# Patient Record
Sex: FEMALE | Race: WHITE | NOT HISPANIC OR LATINO
[De-identification: names, ages, dates, MRNs, and addresses within clinical notes are randomized per-mention and may not be internally consistent; named-entity substitution may affect disease eponyms.]

---

## 2017-05-23 ENCOUNTER — APPOINTMENT (OUTPATIENT)
Dept: ENDOCRINOLOGY | Facility: CLINIC | Age: 62
End: 2017-05-23

## 2019-04-22 ENCOUNTER — APPOINTMENT (OUTPATIENT)
Dept: HEMATOLOGY ONCOLOGY | Facility: CLINIC | Age: 64
End: 2019-04-22
Payer: MEDICARE

## 2019-04-22 VITALS
HEIGHT: 69 IN | TEMPERATURE: 98.7 F | DIASTOLIC BLOOD PRESSURE: 60 MMHG | HEART RATE: 74 BPM | SYSTOLIC BLOOD PRESSURE: 110 MMHG | OXYGEN SATURATION: 98 % | WEIGHT: 146 LBS | BODY MASS INDEX: 21.62 KG/M2

## 2019-04-22 DIAGNOSIS — Z78.9 OTHER SPECIFIED HEALTH STATUS: ICD-10-CM

## 2019-04-22 DIAGNOSIS — Z83.49 FAMILY HISTORY OF OTHER ENDOCRINE, NUTRITIONAL AND METABOLIC DISEASES: ICD-10-CM

## 2019-04-22 DIAGNOSIS — Z86.19 PERSONAL HISTORY OF OTHER INFECTIOUS AND PARASITIC DISEASES: ICD-10-CM

## 2019-04-22 PROCEDURE — 99205 OFFICE O/P NEW HI 60 MIN: CPT | Mod: 25

## 2019-04-22 PROCEDURE — 36415 COLL VENOUS BLD VENIPUNCTURE: CPT

## 2019-04-22 RX ORDER — DENOSUMAB 60 MG/ML
60 INJECTION SUBCUTANEOUS
Refills: 0 | Status: ACTIVE | COMMUNITY

## 2019-04-22 RX ORDER — NYSTATIN 100000 [USP'U]/ML
SUSPENSION ORAL
Refills: 0 | Status: ACTIVE | COMMUNITY

## 2019-04-22 RX ORDER — TRIAMCINOLONE ACETONIDE 0.1 %
0.1 PASTE (GRAM) DENTAL
Refills: 0 | Status: ACTIVE | COMMUNITY

## 2019-04-22 RX ORDER — CHLORHEXIDINE GLUCONATE 4 %
400 LIQUID (ML) TOPICAL
Refills: 0 | Status: ACTIVE | COMMUNITY

## 2019-04-22 RX ORDER — CALCIUM CARBONATE/VITAMIN D3 600 MG-10
TABLET ORAL
Refills: 0 | Status: ACTIVE | COMMUNITY

## 2019-04-23 ENCOUNTER — LABORATORY RESULT (OUTPATIENT)
Age: 64
End: 2019-04-23

## 2019-04-23 LAB
ALBUMIN MFR SERPL ELPH: 63.8 %
ALBUMIN SERPL ELPH-MCNC: 4.8 G/DL
ALBUMIN SERPL-MCNC: 4.6 G/DL
ALBUMIN/GLOB SERPL: 1.8 RATIO
ALP BLD-CCNC: 43 U/L
ALPHA1 GLOB MFR SERPL ELPH: 3.1 %
ALPHA1 GLOB SERPL ELPH-MCNC: 0.2 G/DL
ALPHA2 GLOB MFR SERPL ELPH: 8.1 %
ALPHA2 GLOB SERPL ELPH-MCNC: 0.6 G/DL
ALT SERPL-CCNC: 15 U/L
ANION GAP SERPL CALC-SCNC: 14 MMOL/L
AST SERPL-CCNC: 27 U/L
B-GLOBULIN MFR SERPL ELPH: 9.4 %
B-GLOBULIN SERPL ELPH-MCNC: 0.7 G/DL
BASOPHILS # BLD AUTO: 0.05 K/UL
BASOPHILS NFR BLD AUTO: 1.1 %
BILIRUB SERPL-MCNC: 0.2 MG/DL
BUN SERPL-MCNC: 21 MG/DL
CALCIUM SERPL-MCNC: 9.9 MG/DL
CHLORIDE SERPL-SCNC: 104 MMOL/L
CO2 SERPL-SCNC: 22 MMOL/L
CREAT SERPL-MCNC: 0.74 MG/DL
DEPRECATED KAPPA LC FREE/LAMBDA SER: 0.91 RATIO
DEPRECATED KAPPA LC FREE/LAMBDA SER: 0.91 RATIO
EOSINOPHIL # BLD AUTO: 0.16 K/UL
EOSINOPHIL NFR BLD AUTO: 3.4 %
ERYTHROCYTE [SEDIMENTATION RATE] IN BLOOD BY WESTERGREN METHOD: 10 MM/HR
FERRITIN SERPL-MCNC: 68 NG/ML
GAMMA GLOB FLD ELPH-MCNC: 1.1 G/DL
GAMMA GLOB MFR SERPL ELPH: 15.6 %
GLUCOSE SERPL-MCNC: 93 MG/DL
HAPTOGLOB SERPL-MCNC: 67 MG/DL
HCT VFR BLD CALC: 33.6 %
HGB BLD-MCNC: 11 G/DL
IGA SER QL IEP: 178 MG/DL
IGG SER QL IEP: 1113 MG/DL
IGM SER QL IEP: 84 MG/DL
IMM GRANULOCYTES NFR BLD AUTO: 0.2 %
INTERPRETATION SERPL IEP-IMP: NORMAL
IRON SATN MFR SERPL: 24 %
IRON SERPL-MCNC: 68 UG/DL
KAPPA LC CSF-MCNC: 1.13 MG/DL
KAPPA LC CSF-MCNC: 1.13 MG/DL
KAPPA LC SERPL-MCNC: 1.03 MG/DL
KAPPA LC SERPL-MCNC: 1.03 MG/DL
LDH SERPL-CCNC: 204 U/L
LYMPHOCYTES # BLD AUTO: 1.62 K/UL
LYMPHOCYTES NFR BLD AUTO: 34.8 %
M PROTEIN SPEC IFE-MCNC: NORMAL
MAN DIFF?: NORMAL
MCHC RBC-ENTMCNC: 32 PG
MCHC RBC-ENTMCNC: 32.7 GM/DL
MCV RBC AUTO: 97.7 FL
MONOCYTES # BLD AUTO: 0.26 K/UL
MONOCYTES NFR BLD AUTO: 5.6 %
NEUTROPHILS # BLD AUTO: 2.55 K/UL
NEUTROPHILS NFR BLD AUTO: 54.9 %
PLATELET # BLD AUTO: 247 K/UL
POTASSIUM SERPL-SCNC: 4.3 MMOL/L
PROT SERPL-MCNC: 7.2 G/DL
RBC # BLD: 3.44 M/UL
RBC # FLD: 11.8 %
RHEUMATOID FACT SER QL: <10 IU/ML
SODIUM SERPL-SCNC: 140 MMOL/L
TIBC SERPL-MCNC: 287 UG/DL
UIBC SERPL-MCNC: 219 UG/DL
WBC # FLD AUTO: 4.65 K/UL

## 2019-04-24 LAB — ANA SER IF-ACNC: NEGATIVE

## 2019-04-25 LAB
COPPER SERPL-MCNC: 94 UG/DL
ZINC SERPL-MCNC: 92 UG/DL

## 2019-05-08 ENCOUNTER — APPOINTMENT (OUTPATIENT)
Dept: HEMATOLOGY ONCOLOGY | Facility: CLINIC | Age: 64
End: 2019-05-08
Payer: MEDICARE

## 2019-05-08 VITALS
HEIGHT: 69 IN | BODY MASS INDEX: 21.62 KG/M2 | TEMPERATURE: 98.6 F | DIASTOLIC BLOOD PRESSURE: 70 MMHG | HEART RATE: 72 BPM | OXYGEN SATURATION: 98 % | WEIGHT: 146 LBS | SYSTOLIC BLOOD PRESSURE: 128 MMHG

## 2019-05-08 PROCEDURE — 99212 OFFICE O/P EST SF 10 MIN: CPT

## 2019-05-08 NOTE — HISTORY OF PRESENT ILLNESS
[de-identified] : The patient is a 64 year old female with a history of osteoporosis,pericarditis, mitral valve prolapse, heart murmur, Hashimoto's thyroiditis who presents for evaluation of chronic leukopenia and recent anemia. The patient has had intermittent leukopenia for at least 14 years. She first noted an anemia in 2003 and underwent a gastrointestinal evaluation which was negative. Her last colonoscopy was in 2015 and was also negative. She became anemic again in 2016 and hemoglobin presently is 10.7 with a WBC of 2.7. The patient had a recent B 12 level of 1482 (with supplementation).She has eliminated red meat from her diet since 2003.  She denies fever, chills, sweats, frequent infections, blood in urine or stool or unintentional weight loss. She has had oral ulcers on occasion..

## 2019-05-08 NOTE — REVIEW OF SYSTEMS
[Patient Intake Form Reviewed] : Patient intake form was reviewed [Negative] : Heme/Lymph [Fatigue] : fatigue [Fever] : no fever [Chills] : no chills [Night Sweats] : no night sweats [Recent Change In Weight] : ~T no recent weight change [FreeTextEntry5] : anxiety related PVCs

## 2019-05-08 NOTE — ASSESSMENT
[FreeTextEntry1] : The patient is a 64 year old female with a history of osteoporosis,pericarditis, mitral valve prolapse, heart murmur, Hashimoto's thyroiditis who presents for evaluation of chronic leukopenia and recent anemia. Possible etiologies include iron deficiency anemia, immune disorder, hemolytic anemia, chronic intermittent idiopathic leukopenia, abnormal copper and/or zinc levels or bone marrow disorders such as myelodysplasia or other. Have ordered CBC, sedimentation rate, CMP, immunoglobulin quantitation with immunofixation, copper and zinc levels, FERNANDA, Rheumatoid Factor,LDH, haptoglobin and flow cytometry.  The patient will return in two weeks for discussion of results and further management.

## 2019-05-08 NOTE — CONSULT LETTER
[( Thank you for referring [unfilled] for consultation for _____ )] : Thank you for referring [unfilled] for consultation for [unfilled] [Dear  ___] : Dear  [unfilled], [Please see my note below.] : Please see my note below. [Consult Closing:] : Thank you very much for allowing me to participate in the care of this patient.  If you have any questions, please do not hesitate to contact me. [Sincerely,] : Sincerely, [Consult Letter:] : I had the pleasure of evaluating your patient, [unfilled]. [FreeTextEntry3] : Kathy Fox

## 2019-05-08 NOTE — PHYSICAL EXAM
[Normal] : affect appropriate [de-identified] : mild conjunctival pallor [de-identified] : RRR, S1,S2, murmur [de-identified] : warm, dry, pale, without ecchymoses or petechiae

## 2019-05-08 NOTE — END OF VISIT
[FreeTextEntry3] : All medical record entries made by the Scribe were at my, Dr. Kathy Fox, direction and personally dictated by me on 05/08/2019. I have reviewed the chart and agree that the record accurately reflects my personal performance of the history, physical exam, assessment and plan. I have also personally directed, reviewed, and agreed with the chart.

## 2019-05-08 NOTE — ASSESSMENT
[FreeTextEntry1] : Patient is a 64 year old female with a history of osteoporosis, pericarditis, mitral valve prolapse, heart murmur, Hashimoto's thyroiditis, anemia, and chronic leukopenia, who now presents for a discussion of results and further management.It is likely that the patient has chronic idiopathic intermittent leukopenia. Studies for the mild anemia were normal or nondiagnostic and perhaps is due to a chronic illness.. It is also possible the patient  has a low grade  myelodysplasia . Have discussed the possibility of bone marrow studies sometime in the future, however, will continue surveillance for the time being. Patient will return in three months.

## 2019-05-08 NOTE — HISTORY OF PRESENT ILLNESS
[de-identified] : Patient is a 64 year old female with a history of osteoporosis, pericarditis, mitral valve prolapse, heart murmur, Hashimoto's thyroiditis, anemia, and chronic leukopenia, who now presents for a discussion of results and further management. The patient has had intermittent leukopenia for at least 14 years. She first noted an anemia in 2003 and underwent a gastrointestinal evaluation which was negative. She became anemic again in 2016. Patient's  blood workup at the time of the consultation revealed a WBC of 4.65, hemoglobin 11.0, hematocrit 33.6, iron serum 68, ferritin 68. Protein and immunoelectrophoreses were normal as well as all other studies including peripheral blood flow cytometry. The patient denies fever, chills, sweats, frequent infections, blood in urine or stool or unintentional weight loss.

## 2019-06-24 ENCOUNTER — NON-APPOINTMENT (OUTPATIENT)
Age: 64
End: 2019-06-24

## 2019-06-24 ENCOUNTER — APPOINTMENT (OUTPATIENT)
Dept: OPHTHALMOLOGY | Facility: CLINIC | Age: 64
End: 2019-06-24
Payer: MEDICARE

## 2019-06-24 PROCEDURE — 92004 COMPRE OPH EXAM NEW PT 1/>: CPT

## 2019-07-23 ENCOUNTER — APPOINTMENT (OUTPATIENT)
Dept: HEMATOLOGY ONCOLOGY | Facility: CLINIC | Age: 64
End: 2019-07-23
Payer: MEDICARE

## 2019-07-23 VITALS
HEIGHT: 69 IN | WEIGHT: 150 LBS | DIASTOLIC BLOOD PRESSURE: 70 MMHG | BODY MASS INDEX: 22.22 KG/M2 | TEMPERATURE: 98.1 F | OXYGEN SATURATION: 99 % | HEART RATE: 72 BPM | SYSTOLIC BLOOD PRESSURE: 116 MMHG

## 2019-07-23 LAB
BASOPHILS # BLD AUTO: 0.04 K/UL
BASOPHILS NFR BLD AUTO: 0.8 %
EOSINOPHIL # BLD AUTO: 0.18 K/UL
EOSINOPHIL NFR BLD AUTO: 3.8 %
HCT VFR BLD CALC: 31.6 %
HGB BLD-MCNC: 10 G/DL
IMM GRANULOCYTES NFR BLD AUTO: 0.2 %
LYMPHOCYTES # BLD AUTO: 1.65 K/UL
LYMPHOCYTES NFR BLD AUTO: 35 %
MAN DIFF?: NORMAL
MCHC RBC-ENTMCNC: 31.3 PG
MCHC RBC-ENTMCNC: 31.6 GM/DL
MCV RBC AUTO: 99.1 FL
MONOCYTES # BLD AUTO: 0.3 K/UL
MONOCYTES NFR BLD AUTO: 6.4 %
NEUTROPHILS # BLD AUTO: 2.53 K/UL
NEUTROPHILS NFR BLD AUTO: 53.8 %
PLATELET # BLD AUTO: 213 K/UL
RBC # BLD: 3.19 M/UL
RBC # BLD: 3.19 M/UL
RBC # FLD: 11.6 %
RETICS # AUTO: 0.8 %
RETICS AGGREG/RBC NFR: 26.2 K/UL
WBC # FLD AUTO: 4.71 K/UL

## 2019-07-23 PROCEDURE — 36415 COLL VENOUS BLD VENIPUNCTURE: CPT

## 2019-07-23 PROCEDURE — 99214 OFFICE O/P EST MOD 30 MIN: CPT | Mod: 25

## 2019-07-23 RX ORDER — MULTIVIT-MIN/FOLIC/VIT K/LYCOP 400-300MCG
500 TABLET ORAL
Refills: 0 | Status: ACTIVE | COMMUNITY

## 2019-07-23 NOTE — END OF VISIT
[FreeTextEntry3] : All medical record entries made by the Scribe were at my, Dr. Kathy Fox, direction and personally dictated by me on 07/23/2019. I have reviewed the chart and agree that the record accurately reflects my personal performance of the history, physical exam, assessment and plan. I have also personally directed, reviewed, and agreed with the chart.

## 2019-07-23 NOTE — HISTORY OF PRESENT ILLNESS
[de-identified] : Patient is a 64 year old female with a history of osteoporosis, pericarditis, mitral valve prolapse, heart murmur, Hashimoto's thyroiditis, anemia, and chronic intermittent leukopenia, who now presents hematologic follow-up. Patient's previous blood results from April revealed a WBC of 4.65, hemoglobin of 11.0, and hematocrit of 33.6. The iron serum was 68 and ferritin 68. Protein and immunoelectrophoreses were normal as well as all other studies including peripheral blood flow cytometry. Patient admits to occasional fatigue but is feeling generally well at the time, with no acute complaints. Denies fever, chills, or sweats.

## 2019-07-23 NOTE — ASSESSMENT
[FreeTextEntry1] : Patient is a 64 year old female with a history of osteoporosis, pericarditis, mitral valve prolapse, heart murmur, Hashimoto's thyroiditis, anemia, and chronic intermittent leukopenia, who now presents for hematologic follow-up. It is likely that the patient has chronic idiopathic intermittent leukopenia. Studies for the mild anemia were normal or nondiagnostic and perhaps is due to a chronic illness. It is also possible the patient has a low grade myelodysplasia. Have ordered CBC, reticulocyte count, CMP, B12 and folate, iron panel, haptoglobin, ferritin, and LDH. Will continue surveillance for the time being. Patient was advised to call office to discuss results and next appointment date..

## 2019-07-23 NOTE — PHYSICAL EXAM
[Normal] : normal appearance, no rash, nodules, vesicles, ulcers, erythema [de-identified] : mild conjunctival pallor [de-identified] : RRR, S1,S2, murmur [de-identified] : warm, dry, pale, without ecchymoses or petechiae

## 2019-07-24 LAB
ALBUMIN SERPL ELPH-MCNC: 4.6 G/DL
ALP BLD-CCNC: 47 U/L
ALT SERPL-CCNC: 15 U/L
ANION GAP SERPL CALC-SCNC: 13 MMOL/L
AST SERPL-CCNC: 23 U/L
BILIRUB SERPL-MCNC: 0.2 MG/DL
BUN SERPL-MCNC: 18 MG/DL
CALCIUM SERPL-MCNC: 9.6 MG/DL
CHLORIDE SERPL-SCNC: 100 MMOL/L
CO2 SERPL-SCNC: 24 MMOL/L
CREAT SERPL-MCNC: 0.72 MG/DL
FERRITIN SERPL-MCNC: 62 NG/ML
FOLATE SERPL-MCNC: >20 NG/ML
GLUCOSE SERPL-MCNC: 92 MG/DL
HAPTOGLOB SERPL-MCNC: 62 MG/DL
IRON SATN MFR SERPL: 24 %
IRON SERPL-MCNC: 67 UG/DL
LDH SERPL-CCNC: 209 U/L
POTASSIUM SERPL-SCNC: 4.5 MMOL/L
PROT SERPL-MCNC: 7 G/DL
SODIUM SERPL-SCNC: 137 MMOL/L
TIBC SERPL-MCNC: 279 UG/DL
UIBC SERPL-MCNC: 212 UG/DL
VIT B12 SERPL-MCNC: 912 PG/ML

## 2019-10-22 ENCOUNTER — APPOINTMENT (OUTPATIENT)
Dept: HEMATOLOGY ONCOLOGY | Facility: CLINIC | Age: 64
End: 2019-10-22
Payer: MEDICARE

## 2019-10-22 VITALS
OXYGEN SATURATION: 98 % | SYSTOLIC BLOOD PRESSURE: 110 MMHG | BODY MASS INDEX: 21.92 KG/M2 | TEMPERATURE: 98.8 F | WEIGHT: 148 LBS | HEART RATE: 74 BPM | DIASTOLIC BLOOD PRESSURE: 68 MMHG | HEIGHT: 69 IN

## 2019-10-22 LAB
BASOPHILS # BLD AUTO: 0.04 K/UL
BASOPHILS NFR BLD AUTO: 1 %
EOSINOPHIL # BLD AUTO: 0.11 K/UL
EOSINOPHIL NFR BLD AUTO: 2.7 %
HCT VFR BLD CALC: 33.6 %
HGB BLD-MCNC: 10.4 G/DL
IMM GRANULOCYTES NFR BLD AUTO: 0.2 %
LYMPHOCYTES # BLD AUTO: 1.41 K/UL
LYMPHOCYTES NFR BLD AUTO: 35.1 %
MAN DIFF?: NORMAL
MCHC RBC-ENTMCNC: 31 GM/DL
MCHC RBC-ENTMCNC: 31 PG
MCV RBC AUTO: 100 FL
MONOCYTES # BLD AUTO: 0.25 K/UL
MONOCYTES NFR BLD AUTO: 6.2 %
NEUTROPHILS # BLD AUTO: 2.2 K/UL
NEUTROPHILS NFR BLD AUTO: 54.8 %
PLATELET # BLD AUTO: 245 K/UL
RBC # BLD: 3.36 M/UL
RBC # BLD: 3.36 M/UL
RBC # FLD: 11.9 %
RETICS # AUTO: 0.9 %
RETICS AGGREG/RBC NFR: 30.9 K/UL
WBC # FLD AUTO: 4.02 K/UL

## 2019-10-22 PROCEDURE — 99214 OFFICE O/P EST MOD 30 MIN: CPT | Mod: 25

## 2019-10-22 PROCEDURE — 36415 COLL VENOUS BLD VENIPUNCTURE: CPT

## 2019-10-22 NOTE — PHYSICAL EXAM
[Normal] : affect appropriate [de-identified] : mild conjunctival pallor [de-identified] : RRR, S1,S2, murmur [de-identified] : warm, dry, pale, without ecchymoses or petechiae

## 2019-10-22 NOTE — ASSESSMENT
[FreeTextEntry1] : Patient is a 64 year old female with a history of osteoporosis, pericarditis, mitral valve prolapse, heart murmur, Hashimoto's thyroiditis, anemia, and chronic intermittent leukopenia, who now presents for hematologic follow-up. It is likely that the patient has chronic idiopathic intermittent leukopenia. Studies for the mild anemia were normal or nondiagnostic and perhaps is due to a chronic illness. It is also possible the patient has a low grade myelodysplasia. Have ordered CBC, reticulocyte count, sedimentation rate, CMP, B12 and folate, iron panel, haptoglobin, ferritin, and LDH. Will continue surveillance for the time being. Patient was advised to call office to discuss results and next appointment date.

## 2019-10-22 NOTE — HISTORY OF PRESENT ILLNESS
[de-identified] : Patient is a 64 year old female with a history of osteoporosis, pericarditis, mitral valve prolapse, heart murmur, Hashimoto's thyroiditis, anemia, and chronic intermittent leukopenia, who now presents hematologic follow-up. Patient's previous blood results revealed the hemoglobin was 10.0 with a hematocrit of 31.6. The white blood cell and platelet counts were in normal range. The B12, folate, iron panel, and ferritin were all within normal limits. Since her last visit, the patient had two colonoscopies with Dr. Gavin Yanes (gastroenterologist). On the virtual colonoscopy, she was found to have mild to moderate diverticulosis with an element of spasm in the sigmoid colon. On the colonoscopy, patient was also found to have hemorrhoids. Patient also had an follow-up with Dr. Indiana Egan (endocrinologist), where all endocrinologic labs were normal, aside from a slightly elevated PTH. Patient admits to occasional fatigue and joint pain, but she is otherwise feeling generally well at this time, with no acute complaints. Denies hematuria, hematochezia, gingival bleeding, epistaxis, infection, fever, chills, or sweats. Of note, patient plans to get an influenza vaccination and shingles vaccination after her Prolia injection, which is scheduled for 10/30/19.

## 2019-10-22 NOTE — END OF VISIT
[FreeTextEntry3] : All medical record entries made by the Scribe were at my, Dr. Kathy Fox, direction and personally dictated by me on 10/22/2019. I have reviewed the chart and agree that the record accurately reflects my personal performance of the history, physical exam, assessment and plan. I have also personally directed, reviewed, and agreed with the chart.

## 2019-10-23 ENCOUNTER — LABORATORY RESULT (OUTPATIENT)
Age: 64
End: 2019-10-23

## 2019-10-23 LAB
ALBUMIN SERPL ELPH-MCNC: 4.7 G/DL
ALP BLD-CCNC: 39 U/L
ALT SERPL-CCNC: 14 U/L
ANION GAP SERPL CALC-SCNC: 15 MMOL/L
AST SERPL-CCNC: 24 U/L
BILIRUB SERPL-MCNC: 0.3 MG/DL
BUN SERPL-MCNC: 16 MG/DL
CALCIUM SERPL-MCNC: 9.8 MG/DL
CHLORIDE SERPL-SCNC: 102 MMOL/L
CO2 SERPL-SCNC: 22 MMOL/L
CREAT SERPL-MCNC: 0.63 MG/DL
ERYTHROCYTE [SEDIMENTATION RATE] IN BLOOD BY WESTERGREN METHOD: 15 MM/HR
FERRITIN SERPL-MCNC: 79 NG/ML
FOLATE SERPL-MCNC: >20 NG/ML
GLUCOSE SERPL-MCNC: 80 MG/DL
HAPTOGLOB SERPL-MCNC: 75 MG/DL
IRON SATN MFR SERPL: 19 %
IRON SERPL-MCNC: 62 UG/DL
LDH SERPL-CCNC: 205 U/L
POTASSIUM SERPL-SCNC: 5 MMOL/L
PROT SERPL-MCNC: 7.3 G/DL
SODIUM SERPL-SCNC: 139 MMOL/L
TIBC SERPL-MCNC: 321 UG/DL
UIBC SERPL-MCNC: 259 UG/DL
VIT B12 SERPL-MCNC: 907 PG/ML

## 2019-11-19 ENCOUNTER — APPOINTMENT (OUTPATIENT)
Dept: ORTHOPEDIC SURGERY | Facility: CLINIC | Age: 64
End: 2019-11-19
Payer: MEDICARE

## 2019-11-19 VITALS — RESPIRATION RATE: 16 BRPM | BODY MASS INDEX: 20.33 KG/M2 | WEIGHT: 142 LBS | HEIGHT: 70 IN

## 2019-11-19 DIAGNOSIS — Z00.00 ENCOUNTER FOR GENERAL ADULT MEDICAL EXAMINATION W/OUT ABNORMAL FINDINGS: ICD-10-CM

## 2019-11-19 PROCEDURE — 99203 OFFICE O/P NEW LOW 30 MIN: CPT | Mod: 25

## 2019-11-19 PROCEDURE — 20550 NJX 1 TENDON SHEATH/LIGAMENT: CPT | Mod: FA

## 2019-11-19 PROCEDURE — 73120 X-RAY EXAM OF HAND: CPT | Mod: 50

## 2020-01-28 ENCOUNTER — APPOINTMENT (OUTPATIENT)
Dept: HEMATOLOGY ONCOLOGY | Facility: CLINIC | Age: 65
End: 2020-01-28
Payer: MEDICARE

## 2020-01-28 VITALS
BODY MASS INDEX: 21.19 KG/M2 | WEIGHT: 148 LBS | OXYGEN SATURATION: 99 % | SYSTOLIC BLOOD PRESSURE: 110 MMHG | HEART RATE: 82 BPM | HEIGHT: 70 IN | DIASTOLIC BLOOD PRESSURE: 70 MMHG | TEMPERATURE: 98.4 F

## 2020-01-28 PROCEDURE — 99214 OFFICE O/P EST MOD 30 MIN: CPT | Mod: 25

## 2020-01-28 PROCEDURE — 36415 COLL VENOUS BLD VENIPUNCTURE: CPT

## 2020-01-28 NOTE — END OF VISIT
[FreeTextEntry3] : All medical record entries made by the Scribe were at my, Dr. Kathy Fox, direction and personally dictated by me on 01/28/2020. I have reviewed the chart and agree that the record accurately reflects my personal performance of the history, physical exam, assessment and plan. I have also personally directed, reviewed, and agreed with the chart.

## 2020-01-28 NOTE — PHYSICAL EXAM
[Normal] : affect appropriate [de-identified] : mild conjunctival pallor [de-identified] : warm, dry, pale, without ecchymoses or petechiae [de-identified] : RRR, S1,S2, murmur

## 2020-01-28 NOTE — HISTORY OF PRESENT ILLNESS
[de-identified] : Patient is a 64 year old female with a history of osteoporosis, pericarditis, mitral valve prolapse, heart murmur, Hashimoto's thyroiditis, anemia, and chronic intermittent leukopenia, who now presents hematologic follow-up. Patient's previous blood results revealed a white blood cell count of 4.02, hemoglobin 10.4, hematocrit of 33.6%, and platelets 245,000. Since last visit, the patient received  a Prolia treatment for osteoporosis.. Patient complains of a left thumb trigger finger, for which she received a steroid injection with good results. She is otherwise feeling generally well at this time, with no acute complaints. Denies excessive bruising, rashes, fever, chills, or sweats. Of note, patient had a high dose trivalent influenza vaccination in 12/2019.

## 2020-01-28 NOTE — REVIEW OF SYSTEMS
[Negative] : Allergic/Immunologic [FreeTextEntry9] : has left thumb trigger finger, received injection by Dr. Finley

## 2020-01-28 NOTE — ASSESSMENT
[FreeTextEntry1] : Patient is a 64 year old female with a history of osteoporosis, pericarditis, mitral valve prolapse, heart murmur, Hashimoto's thyroiditis, anemia, and chronic intermittent leukopenia, who now presents for hematologic follow-up.Anemia is likely due to other chronic illness and has remained stable for years. Have ordered CBC, reticulocyte count, CMP, B12 and folate, iron panel, haptoglobin, ferritin, and LDH. Will continue to monitor. Patient was advised to call office to discuss results and next appointment date.

## 2020-01-29 LAB
ALBUMIN SERPL ELPH-MCNC: 4.7 G/DL
ALP BLD-CCNC: 40 U/L
ALT SERPL-CCNC: 15 U/L
ANION GAP SERPL CALC-SCNC: 13 MMOL/L
AST SERPL-CCNC: 25 U/L
BASOPHILS # BLD AUTO: 0.04 K/UL
BASOPHILS NFR BLD AUTO: 0.9 %
BILIRUB SERPL-MCNC: 0.3 MG/DL
BUN SERPL-MCNC: 19 MG/DL
CALCIUM SERPL-MCNC: 9.9 MG/DL
CHLORIDE SERPL-SCNC: 103 MMOL/L
CO2 SERPL-SCNC: 24 MMOL/L
CREAT SERPL-MCNC: 0.72 MG/DL
EOSINOPHIL # BLD AUTO: 0.15 K/UL
EOSINOPHIL NFR BLD AUTO: 3.3 %
FERRITIN SERPL-MCNC: 68 NG/ML
FOLATE SERPL-MCNC: >20 NG/ML
GLUCOSE SERPL-MCNC: 87 MG/DL
HAPTOGLOB SERPL-MCNC: 72 MG/DL
HCT VFR BLD CALC: 33.1 %
HGB BLD-MCNC: 10.5 G/DL
IMM GRANULOCYTES NFR BLD AUTO: 0.2 %
IRON SATN MFR SERPL: 27 %
IRON SERPL-MCNC: 74 UG/DL
LDH SERPL-CCNC: 204 U/L
LYMPHOCYTES # BLD AUTO: 1.47 K/UL
LYMPHOCYTES NFR BLD AUTO: 32.8 %
MAN DIFF?: NORMAL
MCHC RBC-ENTMCNC: 31.6 PG
MCHC RBC-ENTMCNC: 31.7 GM/DL
MCV RBC AUTO: 99.7 FL
MONOCYTES # BLD AUTO: 0.3 K/UL
MONOCYTES NFR BLD AUTO: 6.7 %
NEUTROPHILS # BLD AUTO: 2.51 K/UL
NEUTROPHILS NFR BLD AUTO: 56.1 %
PLATELET # BLD AUTO: 222 K/UL
POTASSIUM SERPL-SCNC: 4.8 MMOL/L
PROT SERPL-MCNC: 7.1 G/DL
RBC # BLD: 3.32 M/UL
RBC # BLD: 3.32 M/UL
RBC # FLD: 11.5 %
RETICS # AUTO: 0.9 %
RETICS AGGREG/RBC NFR: 30.7 K/UL
SODIUM SERPL-SCNC: 140 MMOL/L
TIBC SERPL-MCNC: 276 UG/DL
UIBC SERPL-MCNC: 202 UG/DL
VIT B12 SERPL-MCNC: 856 PG/ML
WBC # FLD AUTO: 4.48 K/UL

## 2020-02-25 ENCOUNTER — APPOINTMENT (OUTPATIENT)
Dept: ORTHOPEDIC SURGERY | Facility: CLINIC | Age: 65
End: 2020-02-25
Payer: MEDICARE

## 2020-02-25 VITALS — WEIGHT: 145 LBS | BODY MASS INDEX: 20.76 KG/M2 | HEIGHT: 70 IN

## 2020-02-25 DIAGNOSIS — M65.312 TRIGGER THUMB, LEFT THUMB: ICD-10-CM

## 2020-02-25 PROCEDURE — 20550 NJX 1 TENDON SHEATH/LIGAMENT: CPT | Mod: FA

## 2020-02-25 PROCEDURE — 99214 OFFICE O/P EST MOD 30 MIN: CPT | Mod: 25

## 2020-05-05 ENCOUNTER — APPOINTMENT (OUTPATIENT)
Dept: HEMATOLOGY ONCOLOGY | Facility: CLINIC | Age: 65
End: 2020-05-05

## 2020-06-22 ENCOUNTER — APPOINTMENT (OUTPATIENT)
Dept: OPHTHALMOLOGY | Facility: CLINIC | Age: 65
End: 2020-06-22
Payer: MEDICARE

## 2020-06-22 ENCOUNTER — NON-APPOINTMENT (OUTPATIENT)
Age: 65
End: 2020-06-22

## 2020-06-22 PROCEDURE — 92014 COMPRE OPH EXAM EST PT 1/>: CPT

## 2020-06-29 ENCOUNTER — APPOINTMENT (OUTPATIENT)
Dept: OPHTHALMOLOGY | Facility: CLINIC | Age: 65
End: 2020-06-29

## 2020-07-21 ENCOUNTER — APPOINTMENT (OUTPATIENT)
Dept: HEMATOLOGY ONCOLOGY | Facility: CLINIC | Age: 65
End: 2020-07-21
Payer: MEDICARE

## 2020-07-21 VITALS
DIASTOLIC BLOOD PRESSURE: 70 MMHG | HEIGHT: 70 IN | TEMPERATURE: 97.5 F | WEIGHT: 146 LBS | HEART RATE: 79 BPM | BODY MASS INDEX: 20.9 KG/M2 | SYSTOLIC BLOOD PRESSURE: 110 MMHG | OXYGEN SATURATION: 97 %

## 2020-07-21 LAB
BASOPHILS # BLD AUTO: 0.04 K/UL
BASOPHILS NFR BLD AUTO: 0.9 %
EOSINOPHIL # BLD AUTO: 0.12 K/UL
EOSINOPHIL NFR BLD AUTO: 2.6 %
HCT VFR BLD CALC: 33.4 %
HGB BLD-MCNC: 10.8 G/DL
IMM GRANULOCYTES NFR BLD AUTO: 0.2 %
LYMPHOCYTES # BLD AUTO: 1.68 K/UL
LYMPHOCYTES NFR BLD AUTO: 36.4 %
MAN DIFF?: NORMAL
MCHC RBC-ENTMCNC: 31.5 PG
MCHC RBC-ENTMCNC: 32.3 GM/DL
MCV RBC AUTO: 97.4 FL
MONOCYTES # BLD AUTO: 0.33 K/UL
MONOCYTES NFR BLD AUTO: 7.1 %
NEUTROPHILS # BLD AUTO: 2.44 K/UL
NEUTROPHILS NFR BLD AUTO: 52.8 %
PLATELET # BLD AUTO: 193 K/UL
RBC # BLD: 3.43 M/UL
RBC # BLD: 3.43 M/UL
RBC # FLD: 11.9 %
RETICS # AUTO: 0.9 %
RETICS AGGREG/RBC NFR: 31.6 K/UL
WBC # FLD AUTO: 4.62 K/UL

## 2020-07-21 PROCEDURE — 36415 COLL VENOUS BLD VENIPUNCTURE: CPT

## 2020-07-21 PROCEDURE — 99213 OFFICE O/P EST LOW 20 MIN: CPT | Mod: 25

## 2020-07-21 NOTE — ASSESSMENT
[FreeTextEntry1] : \par Patient is a 65 year old female with a history of osteoporosis, pericarditis, mitral valve prolapse, heart murmur, Hashimoto's thyroiditis, anemia, and chronic intermittent leukopenia, who now presents for hematologic follow-up.Anemia is likely due to other chronic illness and has remained stable for years. Leukopenia is intermittent and WBC was normal at last visit. Recent CMP, B12 and folate levels normal.  Have ordered CBC, reticulocyte count,  iron panel,  ferritin,FERNANDA and rheumatoid factor.Will continue to monitor. Patient was advised to call office to discuss results and next appointment date. \par \par

## 2020-07-21 NOTE — PHYSICAL EXAM
[Normal] : affect appropriate [de-identified] : RRR, S1,S2, murmur [de-identified] : mild conjunctival pallor [de-identified] : warm, dry, pale, without ecchymoses or petechiae

## 2020-07-21 NOTE — HISTORY OF PRESENT ILLNESS
[de-identified] : \par Patient is a 65 year old female with a history of osteoporosis, pericarditis, mitral valve prolapse, heart murmur, Hashimoto's thyroiditis, anemia, and chronic intermittent leukopenia, who now presents hematologic follow-up. Patient saw her endocrinologist in May and received Prolia for her osteoporosis. Basic chemistries were normal at that time. Patient saw Dr. Kidd in mid-June and CMP was normal, CBC revealed a WBC of 2.9 (always low on a fasting specimen), hemoglobin 11.0 and a normal platelet count. B12 and folate were also normal. Patient also tested negative for Covid-19 antibodies. Patient had trigger finger for which she received a second steroid injection with good results. She is otherwise feeling generally well at this time, with no acute complaints. Denies excessive bruising, rashes, fever, chills, or sweats.  \par \par \par

## 2020-07-21 NOTE — REVIEW OF SYSTEMS
[Fatigue] : fatigue [Joint Stiffness] : joint stiffness [Negative] : Allergic/Immunologic [Chills] : no chills [Fever] : no fever [Night Sweats] : no night sweats [Recent Change In Weight] : ~T no recent weight change [FreeTextEntry2] : slight fatigue at times, otherwise fine [FreeTextEntry9] : stiffness in thumb/trigger finger

## 2020-07-22 ENCOUNTER — LABORATORY RESULT (OUTPATIENT)
Age: 65
End: 2020-07-22

## 2020-07-22 LAB
ANA SER IF-ACNC: NEGATIVE
FERRITIN SERPL-MCNC: 80 NG/ML
IRON SATN MFR SERPL: 29 %
IRON SERPL-MCNC: 83 UG/DL
RHEUMATOID FACT SER QL: <10 IU/ML
TIBC SERPL-MCNC: 285 UG/DL
UIBC SERPL-MCNC: 202 UG/DL

## 2020-10-20 ENCOUNTER — APPOINTMENT (OUTPATIENT)
Dept: HEMATOLOGY ONCOLOGY | Facility: CLINIC | Age: 65
End: 2020-10-20
Payer: MEDICARE

## 2020-10-20 VITALS
BODY MASS INDEX: 20.9 KG/M2 | HEIGHT: 70 IN | TEMPERATURE: 97.5 F | OXYGEN SATURATION: 98 % | DIASTOLIC BLOOD PRESSURE: 68 MMHG | SYSTOLIC BLOOD PRESSURE: 116 MMHG | WEIGHT: 146 LBS | HEART RATE: 79 BPM

## 2020-10-20 PROCEDURE — 99214 OFFICE O/P EST MOD 30 MIN: CPT | Mod: 25

## 2020-10-20 PROCEDURE — 36415 COLL VENOUS BLD VENIPUNCTURE: CPT

## 2020-10-20 NOTE — END OF VISIT
[FreeTextEntry3] : All medical record entries made by the Scribe were at my, Dr. Kathy Fox, direction and personally dictated by me on 10/20/2020. I have reviewed the chart and agree that the record accurately reflects my personal performance of the history, physical exam, assessment and plan. I have also personally directed, reviewed, and agreed with the chart.

## 2020-10-20 NOTE — REVIEW OF SYSTEMS
[Fatigue] : fatigue [Negative] : Allergic/Immunologic [Fever] : no fever [Night Sweats] : no night sweats [Recent Change In Weight] : ~T no recent weight change [Chills] : no chills [FreeTextEntry2] : Less fatigued, sleeping better [de-identified] : recent skin  infection of the left great toe, Treated successfully with Keflex and soaks

## 2020-10-20 NOTE — ASSESSMENT
[FreeTextEntry1] : Patient is a 65 year old female with a history of osteoporosis, pericarditis, mitral valve prolapse, heart murmur, Hashimoto's thyroiditis, anemia, and chronic intermittent leukopenia, who now presents for hematologic follow-up. Anemia is likely due to other chronic illness and has remained stable for years. Leukopenia is intermittent and WBC was normal at last visit. Have ordered B12 and folate, CBC, CMP, ferritin, iron panel, haptoglobin, LDH, reticulocyte count. Will continue to monitor. Patient was advised to call office to discuss results and next appointment date.

## 2020-10-20 NOTE — HISTORY OF PRESENT ILLNESS
[de-identified] : Patient is a 65 year old female with a history of osteoporosis, pericarditis, mitral valve prolapse, heart murmur, Hashimoto's thyroiditis, anemia, and chronic intermittent leukopenia, who now presents hematologic follow-up. Patient saw her endocrinologist recently and received Prolia for her osteoporosis. Blood results from last visit in July revealed the white blood count was normal, as were the platelets. Hemoglobin was slightly low, but stable at 10.8. Iron panel and ferritin were normal and remaining results are also normal. She recently had an infection of the left hallux, treated successfully with Keflex and soaks. Patient complains of some fatigue, but states she is sleeping better. Denies excessive bruising, recent infection, fever, chills, or sweats. She states she will get an influenza vaccine soon.

## 2020-10-20 NOTE — PHYSICAL EXAM
[Normal] : affect appropriate [de-identified] : warm, dry, pale, without ecchymoses or petechiae [de-identified] : RRR, S1,S2, murmur

## 2020-10-21 LAB
ALBUMIN SERPL ELPH-MCNC: 4.4 G/DL
ALP BLD-CCNC: 53 U/L
ALT SERPL-CCNC: 17 U/L
ANION GAP SERPL CALC-SCNC: 13 MMOL/L
AST SERPL-CCNC: 26 U/L
BASOPHILS # BLD AUTO: 0.04 K/UL
BASOPHILS NFR BLD AUTO: 1 %
BILIRUB SERPL-MCNC: 0.3 MG/DL
BUN SERPL-MCNC: 20 MG/DL
CALCIUM SERPL-MCNC: 9.5 MG/DL
CHLORIDE SERPL-SCNC: 101 MMOL/L
CO2 SERPL-SCNC: 24 MMOL/L
CREAT SERPL-MCNC: 0.68 MG/DL
EOSINOPHIL # BLD AUTO: 0.18 K/UL
EOSINOPHIL NFR BLD AUTO: 4.6 %
FERRITIN SERPL-MCNC: 78 NG/ML
FOLATE SERPL-MCNC: >20 NG/ML
GLUCOSE SERPL-MCNC: 101 MG/DL
HAPTOGLOB SERPL-MCNC: 72 MG/DL
HCT VFR BLD CALC: 32 %
HGB BLD-MCNC: 10.2 G/DL
IMM GRANULOCYTES NFR BLD AUTO: 0 %
IRON SATN MFR SERPL: 25 %
IRON SERPL-MCNC: 72 UG/DL
LDH SERPL-CCNC: 193 U/L
LYMPHOCYTES # BLD AUTO: 1.59 K/UL
LYMPHOCYTES NFR BLD AUTO: 40.9 %
MAN DIFF?: NORMAL
MCHC RBC-ENTMCNC: 31.9 GM/DL
MCHC RBC-ENTMCNC: 31.9 PG
MCV RBC AUTO: 100 FL
MONOCYTES # BLD AUTO: 0.24 K/UL
MONOCYTES NFR BLD AUTO: 6.2 %
NEUTROPHILS # BLD AUTO: 1.84 K/UL
NEUTROPHILS NFR BLD AUTO: 47.3 %
PLATELET # BLD AUTO: 221 K/UL
POTASSIUM SERPL-SCNC: 4.7 MMOL/L
PROT SERPL-MCNC: 6.9 G/DL
RBC # BLD: 3.2 M/UL
RBC # BLD: 3.2 M/UL
RBC # FLD: 11.6 %
RETICS # AUTO: 0.9 %
RETICS AGGREG/RBC NFR: 28.2 K/UL
SODIUM SERPL-SCNC: 137 MMOL/L
TIBC SERPL-MCNC: 289 UG/DL
UIBC SERPL-MCNC: 217 UG/DL
VIT B12 SERPL-MCNC: 962 PG/ML
WBC # FLD AUTO: 3.89 K/UL

## 2021-02-16 ENCOUNTER — APPOINTMENT (OUTPATIENT)
Dept: HEMATOLOGY ONCOLOGY | Facility: CLINIC | Age: 66
End: 2021-02-16

## 2021-03-23 ENCOUNTER — APPOINTMENT (OUTPATIENT)
Dept: HEMATOLOGY ONCOLOGY | Facility: CLINIC | Age: 66
End: 2021-03-23

## 2021-04-20 ENCOUNTER — APPOINTMENT (OUTPATIENT)
Dept: HEMATOLOGY ONCOLOGY | Facility: CLINIC | Age: 66
End: 2021-04-20
Payer: MEDICARE

## 2021-04-20 VITALS
HEART RATE: 61 BPM | TEMPERATURE: 97.8 F | BODY MASS INDEX: 20.62 KG/M2 | WEIGHT: 144 LBS | DIASTOLIC BLOOD PRESSURE: 60 MMHG | OXYGEN SATURATION: 98 % | SYSTOLIC BLOOD PRESSURE: 102 MMHG | HEIGHT: 70 IN

## 2021-04-20 LAB
RBC # BLD: 3.24 M/UL
RETICS # AUTO: 1 %
RETICS AGGREG/RBC NFR: 31.4 K/UL

## 2021-04-20 PROCEDURE — 99213 OFFICE O/P EST LOW 20 MIN: CPT | Mod: 25

## 2021-04-20 PROCEDURE — 36415 COLL VENOUS BLD VENIPUNCTURE: CPT

## 2021-04-20 NOTE — PHYSICAL EXAM
[Normal] : affect appropriate [de-identified] : RRR, S1,S2, murmur [de-identified] : warm, dry, pale, without ecchymoses or petechiae

## 2021-04-20 NOTE — END OF VISIT
[FreeTextEntry3] : All medical record entries made by the Scribe were at my, Dr. Kathy Fox, direction and personally dictated by me on 04/20/2021. I have reviewed the chart and agree that the record accurately reflects my personal performance of the history, physical exam, assessment and plan. I have also personally directed, reviewed, and agreed with the chart.

## 2021-04-20 NOTE — REASON FOR VISIT
[Follow-Up Visit] : a follow-up visit for [FreeTextEntry2] : Anemia of chronic illness, fatigue, leukopenia

## 2021-04-20 NOTE — REVIEW OF SYSTEMS
[Negative] : Allergic/Immunologic [Fever] : no fever [Chills] : no chills [Night Sweats] : no night sweats [Fatigue] : no fatigue

## 2021-04-20 NOTE — ASSESSMENT
[FreeTextEntry1] : Patient is a 66 year old female with a history of osteoporosis, pericarditis, mitral valve prolapse, heart murmur, Hashimoto's thyroiditis, anemia, and chronic intermittent leukopenia, who now presents for hematologic follow-up. Anemia is likely due to her immune disorder other chronic illness and has remained stable for years. Leukopenia is intermittent and WBC was normal at last visit. Have ordered B12 and folate, CBC, CMP, ferritin, iron panel, haptoglobin, LDH, reticulocyte count. Will continue to monitor. Patient was advised to call office to discuss results and next appointment date.

## 2021-04-20 NOTE — HISTORY OF PRESENT ILLNESS
[de-identified] : Patient is a 66 year old female with a history of osteoporosis, pericarditis, mitral valve prolapse, heart murmur, Hashimoto's thyroiditis, anemia, and chronic intermittent leukopenia, who now presents hematologic follow-up. Patient receives Prolia for her osteoporosis. Blood results from last visit in October revealed the white blood count was normal, as were the platelets. Hemoglobin was slightly decreased, but stable at 10.2. The CMP was normal except for glucose of 101. Iron panel and ferritin were normal and remaining results were also normal. Patient is feeling generally well at this time. Denies excessive bleeding or bruising, recent infection, fever, chills, or sweats. Of note, patient recently received second dose of Pfizer Covid-19 vaccine.

## 2021-04-21 LAB
ALBUMIN SERPL ELPH-MCNC: 4.7 G/DL
ALP BLD-CCNC: 51 U/L
ALT SERPL-CCNC: 13 U/L
ANION GAP SERPL CALC-SCNC: 13 MMOL/L
AST SERPL-CCNC: 28 U/L
BASOPHILS # BLD AUTO: 0.04 K/UL
BASOPHILS # BLD AUTO: 0.04 K/UL
BASOPHILS NFR BLD AUTO: 0.9 %
BASOPHILS NFR BLD AUTO: 0.9 %
BILIRUB SERPL-MCNC: 0.3 MG/DL
BUN SERPL-MCNC: 22 MG/DL
CALCIUM SERPL-MCNC: 9.9 MG/DL
CHLORIDE SERPL-SCNC: 100 MMOL/L
CO2 SERPL-SCNC: 23 MMOL/L
CREAT SERPL-MCNC: 0.72 MG/DL
EOSINOPHIL # BLD AUTO: 0.24 K/UL
EOSINOPHIL # BLD AUTO: 0.24 K/UL
EOSINOPHIL NFR BLD AUTO: 6.1 %
EOSINOPHIL NFR BLD AUTO: 6.1 %
FERRITIN SERPL-MCNC: 86 NG/ML
FOLATE SERPL-MCNC: >20 NG/ML
GIANT PLATELETS BLD QL SMEAR: PRESENT
GLUCOSE SERPL-MCNC: 95 MG/DL
HAPTOGLOB SERPL-MCNC: 69 MG/DL
HCT VFR BLD CALC: 31.8 %
HGB BLD-MCNC: 10.4 G/DL
HYPOCHROMIA BLD QL SMEAR: SLIGHT
IRON SATN MFR SERPL: 27 %
IRON SERPL-MCNC: 80 UG/DL
LDH SERPL-CCNC: 229 U/L
LYMPHOCYTES # BLD AUTO: 1.36 K/UL
LYMPHOCYTES # BLD AUTO: 1.36 K/UL
LYMPHOCYTES NFR BLD AUTO: 34.8 %
LYMPHOCYTES NFR BLD AUTO: 34.8 %
MAN DIFF?: NORMAL
MCHC RBC-ENTMCNC: 32.1 PG
MCHC RBC-ENTMCNC: 32.7 GM/DL
MCV RBC AUTO: 98.1 FL
MONOCYTES # BLD AUTO: 0.1 K/UL
MONOCYTES # BLD AUTO: 0.1 K/UL
MONOCYTES NFR BLD AUTO: 2.6 %
MONOCYTES NFR BLD AUTO: 2.6 %
MSMEAR: NORMAL
NEUTROPHILS # BLD AUTO: 2.18 K/UL
NEUTROPHILS # BLD AUTO: 2.18 K/UL
NEUTROPHILS NFR BLD AUTO: 53.9 %
NEUTROPHILS NFR BLD AUTO: 53.9 %
NEUTS BAND NFR BLD MANUAL: 1.7 %
OVALOCYTES BLD QL SMEAR: SLIGHT
PLAT MORPH BLD: ABNORMAL
PLATELET # BLD AUTO: 201 K/UL
POIKILOCYTOSIS BLD QL SMEAR: SLIGHT
POLYCHROMASIA BLD QL SMEAR: SLIGHT
POTASSIUM SERPL-SCNC: 4.5 MMOL/L
PROT SERPL-MCNC: 7.5 G/DL
RBC # BLD: 3.24 M/UL
RBC # FLD: 11.6 %
RBC BLD AUTO: ABNORMAL
SODIUM SERPL-SCNC: 136 MMOL/L
SPHEROCYTES BLD QL SMEAR: SLIGHT
TIBC SERPL-MCNC: 293 UG/DL
UIBC SERPL-MCNC: 213 UG/DL
VIT B12 SERPL-MCNC: 887 PG/ML
WBC # FLD AUTO: 3.92 K/UL

## 2021-04-22 ENCOUNTER — NON-APPOINTMENT (OUTPATIENT)
Age: 66
End: 2021-04-22

## 2021-06-28 ENCOUNTER — APPOINTMENT (OUTPATIENT)
Dept: OPHTHALMOLOGY | Facility: CLINIC | Age: 66
End: 2021-06-28
Payer: MEDICARE

## 2021-06-28 ENCOUNTER — NON-APPOINTMENT (OUTPATIENT)
Age: 66
End: 2021-06-28

## 2021-06-28 PROCEDURE — 92014 COMPRE OPH EXAM EST PT 1/>: CPT

## 2021-06-28 PROCEDURE — 92250 FUNDUS PHOTOGRAPHY W/I&R: CPT

## 2021-07-20 ENCOUNTER — APPOINTMENT (OUTPATIENT)
Dept: HEMATOLOGY ONCOLOGY | Facility: CLINIC | Age: 66
End: 2021-07-20
Payer: MEDICARE

## 2021-07-20 VITALS
DIASTOLIC BLOOD PRESSURE: 64 MMHG | HEIGHT: 70 IN | SYSTOLIC BLOOD PRESSURE: 112 MMHG | BODY MASS INDEX: 20.33 KG/M2 | HEART RATE: 76 BPM | TEMPERATURE: 97.5 F | WEIGHT: 142 LBS | OXYGEN SATURATION: 98 %

## 2021-07-20 LAB
RBC # BLD: 3.34 M/UL
RETICS # AUTO: 0.8 %
RETICS AGGREG/RBC NFR: 27.7 K/UL

## 2021-07-20 PROCEDURE — 99214 OFFICE O/P EST MOD 30 MIN: CPT | Mod: 25

## 2021-07-20 PROCEDURE — 36415 COLL VENOUS BLD VENIPUNCTURE: CPT

## 2021-07-20 NOTE — PHYSICAL EXAM
[Normal] : affect appropriate [de-identified] : RRR, S1,S2, murmur [de-identified] : warm, dry, pale, without ecchymoses or petechiae

## 2021-07-20 NOTE — ASSESSMENT
[FreeTextEntry1] : Patient is a 66 year old female with a history of osteoporosis, pericarditis, mitral valve prolapse, heart murmur, Hashimoto's thyroiditis, anemia, and chronic intermittent leukopenia, who now presents for hematologic follow-up. Anemia is likely due to her immune disorder/other chronic illness, and has remained stable for years. Leukopenia is intermittent and WBC was essentially normal at her last visit to this office, however myelodysplasia remains a possibility. Bone marrow studies were again discussed but patient feels generally well and wishes to continue with blood count monitoring for the time being. Have ordered FERNANDA, CBC, CMP, ferritin, haptoglobin, iron panel, LDH, SPEP, reticulocyte count, and rheumatoid factor. Will continue to monitor. Patient was advised to call office to discuss results and next appointment date. \par

## 2021-07-20 NOTE — HISTORY OF PRESENT ILLNESS
[de-identified] : Patient is a 66 year old female with a history of osteoporosis, pericarditis, mitral valve prolapse, heart murmur, Hashimoto's thyroiditis, anemia, and chronic intermittent leukopenia, who now presents hematologic follow-up. Patient receives Prolia injections every six months for her osteoporosis. She also received the first of two Shingrix vaccines in June. Patient saw Dr. Kidd in mid-June (before Shingrix vaccine), at which time the white blood count was 2.8, hemoglobin was 10.5, and platelet count was 208,000. Comprehensive metabolic panel was normal. TSH was slightly low at 0.37. B12 was normal at 683, and folate was also normal. Patient notes that each time she fasts for blood work, her white count is usually below normal. The non-fasting specimens from this office have been with normal white blood counts. Patient is feeling generally well with no acute complaints. Denies unintentional weight loss, excessive bruising, recent or recurrent infections, fever, chills, or sweats.

## 2021-07-20 NOTE — REVIEW OF SYSTEMS
[Negative] : Allergic/Immunologic [Fever] : no fever [Chills] : no chills [Fatigue] : no fatigue [Recent Change In Weight] : ~T no recent weight change

## 2021-07-20 NOTE — END OF VISIT
[FreeTextEntry3] : All medical record entries made by the Scribe were at my, Dr. Kathy Fox, direction and personally dictated by me on 07/20/2021. I have reviewed the chart and agree that the record accurately reflects my personal performance of the history, physical exam, assessment and plan. I have also personally directed, reviewed, and agreed with the chart.

## 2021-07-21 LAB
ANISOCYTOSIS BLD QL: SLIGHT
BASOPHILS # BLD AUTO: 0.11 K/UL
BASOPHILS # BLD AUTO: 0.11 K/UL
BASOPHILS NFR BLD AUTO: 2.6 %
BASOPHILS NFR BLD AUTO: 2.6 %
EOSINOPHIL # BLD AUTO: 0.15 K/UL
EOSINOPHIL # BLD AUTO: 0.15 K/UL
EOSINOPHIL NFR BLD AUTO: 3.5 %
EOSINOPHIL NFR BLD AUTO: 3.5 %
GIANT PLATELETS BLD QL SMEAR: PRESENT
HCT VFR BLD CALC: 33 %
HGB BLD-MCNC: 10.6 G/DL
HYPOCHROMIA BLD QL SMEAR: SLIGHT
LYMPHOCYTES # BLD AUTO: 1.66 K/UL
LYMPHOCYTES # BLD AUTO: 1.66 K/UL
LYMPHOCYTES NFR BLD AUTO: 38.2 %
LYMPHOCYTES NFR BLD AUTO: 38.2 %
MAN DIFF?: NORMAL
MCHC RBC-ENTMCNC: 31.7 PG
MCHC RBC-ENTMCNC: 32.1 GM/DL
MCV RBC AUTO: 98.8 FL
MONOCYTES # BLD AUTO: 0.11 K/UL
MONOCYTES # BLD AUTO: 0.11 K/UL
MONOCYTES NFR BLD AUTO: 2.6 %
MONOCYTES NFR BLD AUTO: 2.6 %
MSMEAR: NORMAL
NEUTROPHILS # BLD AUTO: 2.31 K/UL
NEUTROPHILS # BLD AUTO: 2.31 K/UL
NEUTROPHILS NFR BLD AUTO: 52.2 %
NEUTROPHILS NFR BLD AUTO: 52.2 %
NEUTS BAND NFR BLD MANUAL: 0.9 %
PLAT MORPH BLD: NORMAL
PLATELET # BLD AUTO: 205 K/UL
POIKILOCYTOSIS BLD QL SMEAR: SLIGHT
POLYCHROMASIA BLD QL SMEAR: SLIGHT
RBC # BLD: 3.34 M/UL
RBC # FLD: 11.6 %
RBC BLD AUTO: ABNORMAL
WBC # FLD AUTO: 4.35 K/UL

## 2021-07-22 LAB
ALBUMIN SERPL ELPH-MCNC: 5 G/DL
ALP BLD-CCNC: 51 U/L
ALT SERPL-CCNC: 14 U/L
ANA SER IF-ACNC: NEGATIVE
ANION GAP SERPL CALC-SCNC: 16 MMOL/L
AST SERPL-CCNC: 27 U/L
BILIRUB SERPL-MCNC: 0.3 MG/DL
BUN SERPL-MCNC: 19 MG/DL
CALCIUM SERPL-MCNC: 9.9 MG/DL
CHLORIDE SERPL-SCNC: 101 MMOL/L
CO2 SERPL-SCNC: 20 MMOL/L
CREAT SERPL-MCNC: 0.69 MG/DL
FERRITIN SERPL-MCNC: 74 NG/ML
GLUCOSE SERPL-MCNC: 86 MG/DL
HAPTOGLOB SERPL-MCNC: 68 MG/DL
IRON SATN MFR SERPL: 22 %
IRON SERPL-MCNC: 66 UG/DL
LDH SERPL-CCNC: 220 U/L
POTASSIUM SERPL-SCNC: 4.1 MMOL/L
PROT SERPL-MCNC: 7.3 G/DL
RHEUMATOID FACT SER QL: <10 IU/ML
SODIUM SERPL-SCNC: 136 MMOL/L
TIBC SERPL-MCNC: 299 UG/DL
UIBC SERPL-MCNC: 233 UG/DL

## 2021-07-26 ENCOUNTER — NON-APPOINTMENT (OUTPATIENT)
Age: 66
End: 2021-07-26

## 2021-07-28 LAB
ALBUMIN MFR SERPL ELPH: 63.8 %
ALBUMIN SERPL-MCNC: 4.7 G/DL
ALBUMIN/GLOB SERPL: 1.8 RATIO
ALPHA1 GLOB MFR SERPL ELPH: 3 %
ALPHA1 GLOB SERPL ELPH-MCNC: 0.2 G/DL
ALPHA2 GLOB MFR SERPL ELPH: 8.8 %
ALPHA2 GLOB SERPL ELPH-MCNC: 0.6 G/DL
B-GLOBULIN MFR SERPL ELPH: 9.8 %
B-GLOBULIN SERPL ELPH-MCNC: 0.7 G/DL
GAMMA GLOB FLD ELPH-MCNC: 1.1 G/DL
GAMMA GLOB MFR SERPL ELPH: 14.6 %
INTERPRETATION SERPL IEP-IMP: NORMAL
PROT SERPL-MCNC: 7.3 G/DL
PROT SERPL-MCNC: 7.3 G/DL

## 2021-10-19 ENCOUNTER — APPOINTMENT (OUTPATIENT)
Dept: HEMATOLOGY ONCOLOGY | Facility: CLINIC | Age: 66
End: 2021-10-19
Payer: MEDICARE

## 2021-10-19 VITALS
TEMPERATURE: 96.9 F | DIASTOLIC BLOOD PRESSURE: 60 MMHG | HEART RATE: 75 BPM | HEIGHT: 70 IN | WEIGHT: 138 LBS | OXYGEN SATURATION: 98 % | SYSTOLIC BLOOD PRESSURE: 120 MMHG | BODY MASS INDEX: 19.76 KG/M2

## 2021-10-19 LAB
RBC # BLD: 3.28 M/UL
RETICS # AUTO: 0.9 %
RETICS AGGREG/RBC NFR: 29.8 K/UL

## 2021-10-19 PROCEDURE — 99213 OFFICE O/P EST LOW 20 MIN: CPT | Mod: 25

## 2021-10-19 PROCEDURE — 36415 COLL VENOUS BLD VENIPUNCTURE: CPT

## 2021-10-19 NOTE — HISTORY OF PRESENT ILLNESS
[de-identified] : Patient is a 66 year old female with a history of osteoporosis, pericarditis, mitral valve prolapse, heart murmur, Hashimoto's thyroiditis, anemia, and chronic intermittent leukopenia, who now presents hematologic follow-up. Patient receives Prolia injections every six months for her osteoporosis. She also received both doses of the Shingrix vaccines in June and September. Blood results from her last visit in July 2021 revealed a normal white blood count of 4.35, a stable hemoglobin of 10.6, hematocrit of 33.9, and normal platelet count of 205,000. FERNANDA was negative and rheumatoid factor was <10. Iron panel and ferritin were normal. Comprehensive metabolic panel was unremarkable. Protein electrophoresis revealed a normal electrophoretic pattern with no monoclonal bands. Patient notes that each time she fasts for blood work, her white count is usually below normal. The non-fasting specimens from this office have been with normal white blood counts. Since the last visit, patient had a mammogram, the results of which were normal. She is feeling generally well at this time, with no acute complaints. Denies bleeding from the nose or mouth, hematuria, hematochezia, fatigue, recent infections, fever, chills, or sweats.  Of note, patient is planning to get the Pfizer COVID-19 booster vaccine.

## 2021-10-19 NOTE — ASSESSMENT
[FreeTextEntry1] : Patient is a 66 year old female with a history of osteoporosis, pericarditis, mitral valve prolapse, heart murmur, Hashimoto's thyroiditis, anemia, and chronic intermittent leukopenia, who now presents for hematologic follow-up. Anemia is likely due to her immune disorder/other chronic illness, and has remained stable for years. Leukopenia is intermittent and the white blood count has been essentially normal for her last two visits to this office. However, myelodysplasia remains a possibility. Bone marrow studies have been discussed, but patient feels generally well and wishes to continue with blood count monitoring for the time being. Have ordered B12 and folate, CBC, CMP, ferritin, iron panel, and reticulocyte count. Patient was advised to call office to discuss results and next appointment date.

## 2021-10-19 NOTE — END OF VISIT
[FreeTextEntry3] : All medical record entries made by the Scribe were at my, Dr. Kathy Fox, direction and personally dictated by me on 10/19/2021. I have reviewed the chart and agree that the record accurately reflects my personal performance of the history, physical exam, assessment and plan. I have also personally directed, reviewed, and agreed with the chart.

## 2021-10-19 NOTE — PHYSICAL EXAM
[Normal] : affect appropriate [de-identified] : RRR, S1,S2, murmur [de-identified] : warm, dry, pale, without ecchymoses or petechiae

## 2021-10-20 LAB
ALBUMIN SERPL ELPH-MCNC: 4.9 G/DL
ALP BLD-CCNC: 47 U/L
ALT SERPL-CCNC: 19 U/L
ANION GAP SERPL CALC-SCNC: 12 MMOL/L
ANISOCYTOSIS BLD QL: SLIGHT
AST SERPL-CCNC: 26 U/L
BASOPHILS # BLD AUTO: 0.07 K/UL
BASOPHILS # BLD AUTO: 0.07 K/UL
BASOPHILS NFR BLD AUTO: 1.7 %
BASOPHILS NFR BLD AUTO: 1.7 %
BILIRUB SERPL-MCNC: 0.3 MG/DL
BUN SERPL-MCNC: 19 MG/DL
CALCIUM SERPL-MCNC: 9.7 MG/DL
CHLORIDE SERPL-SCNC: 101 MMOL/L
CO2 SERPL-SCNC: 24 MMOL/L
CREAT SERPL-MCNC: 0.69 MG/DL
EOSINOPHIL # BLD AUTO: 0.11 K/UL
EOSINOPHIL # BLD AUTO: 0.11 K/UL
EOSINOPHIL NFR BLD AUTO: 2.6 %
EOSINOPHIL NFR BLD AUTO: 2.6 %
FERRITIN SERPL-MCNC: 85 NG/ML
FOLATE SERPL-MCNC: >20 NG/ML
GIANT PLATELETS BLD QL SMEAR: PRESENT
GLUCOSE SERPL-MCNC: 72 MG/DL
HCT VFR BLD CALC: 32.9 %
HGB BLD-MCNC: 10.4 G/DL
HYPOCHROMIA BLD QL SMEAR: SLIGHT
IRON SATN MFR SERPL: 24 %
IRON SERPL-MCNC: 71 UG/DL
LYMPHOCYTES # BLD AUTO: 1.25 K/UL
LYMPHOCYTES # BLD AUTO: 1.25 K/UL
LYMPHOCYTES NFR BLD AUTO: 28.7 %
LYMPHOCYTES NFR BLD AUTO: 28.7 %
MACROCYTES BLD QL SMEAR: SLIGHT
MAN DIFF?: NORMAL
MCHC RBC-ENTMCNC: 31.6 GM/DL
MCHC RBC-ENTMCNC: 31.7 PG
MCV RBC AUTO: 100.3 FL
MONOCYTES # BLD AUTO: 0.27 K/UL
MONOCYTES # BLD AUTO: 0.27 K/UL
MONOCYTES NFR BLD AUTO: 6.1 %
MONOCYTES NFR BLD AUTO: 6.1 %
MSMEAR: NORMAL
NEUTROPHILS # BLD AUTO: 2.65 K/UL
NEUTROPHILS # BLD AUTO: 2.65 K/UL
NEUTROPHILS NFR BLD AUTO: 60.9 %
NEUTROPHILS NFR BLD AUTO: 60.9 %
PLAT MORPH BLD: ABNORMAL
PLATELET # BLD AUTO: 234 K/UL
POIKILOCYTOSIS BLD QL SMEAR: SLIGHT
POTASSIUM SERPL-SCNC: 4.1 MMOL/L
PROT SERPL-MCNC: 7.4 G/DL
RBC # BLD: 3.28 M/UL
RBC # FLD: 11.8 %
RBC BLD AUTO: ABNORMAL
SODIUM SERPL-SCNC: 138 MMOL/L
SPHEROCYTES BLD QL SMEAR: SLIGHT
TIBC SERPL-MCNC: 298 UG/DL
UIBC SERPL-MCNC: 227 UG/DL
VIT B12 SERPL-MCNC: 1680 PG/ML
WBC # FLD AUTO: 4.35 K/UL

## 2021-10-21 ENCOUNTER — NON-APPOINTMENT (OUTPATIENT)
Age: 66
End: 2021-10-21

## 2022-02-22 ENCOUNTER — APPOINTMENT (OUTPATIENT)
Dept: HEMATOLOGY ONCOLOGY | Facility: CLINIC | Age: 67
End: 2022-02-22
Payer: MEDICARE

## 2022-02-22 VITALS
TEMPERATURE: 97.1 F | DIASTOLIC BLOOD PRESSURE: 60 MMHG | HEIGHT: 70 IN | SYSTOLIC BLOOD PRESSURE: 112 MMHG | WEIGHT: 137 LBS | BODY MASS INDEX: 19.61 KG/M2 | OXYGEN SATURATION: 98 % | HEART RATE: 82 BPM

## 2022-02-22 LAB
RBC # BLD: 3.34 M/UL
RETICS # AUTO: 0.9 %
RETICS AGGREG/RBC NFR: 28.7 K/UL

## 2022-02-22 PROCEDURE — 99213 OFFICE O/P EST LOW 20 MIN: CPT | Mod: 25

## 2022-02-22 PROCEDURE — 36415 COLL VENOUS BLD VENIPUNCTURE: CPT

## 2022-02-22 RX ORDER — LEVOTHYROXINE SODIUM 0.11 MG/1
112 TABLET ORAL
Refills: 0 | Status: ACTIVE | COMMUNITY

## 2022-02-22 NOTE — ASSESSMENT
[FreeTextEntry1] : Patient is a 67 year old female with a history of osteoporosis, pericarditis, mitral valve prolapse, heart murmur, Hashimoto's thyroiditis, anemia, and chronic intermittent leukopenia, who now presents for hematologic follow-up. Anemia is likely due to her immune disorder/other chronic illness, and has remained stable for years. Leukopenia is intermittent and the white blood count has been essentially normal for some time. However, myelodysplasia remains a possibility. Bone marrow studies have been discussed, but patient feels generally well and wishes to continue with blood count monitoring for the time being. Have ordered. Venipuncture was performed today at the office. Patient was advised to call office to discuss results and next appointment date. \par

## 2022-02-22 NOTE — END OF VISIT
[FreeTextEntry3] : All medical record entries made by the Scribe were at my, Dr. Kathy Fox, direction and personally dictated by me on 02/22/2022. I have reviewed the chart and agree that the record accurately reflects my personal performance of the history, physical exam, assessment and plan. I have also personally directed, reviewed, and agreed with the chart.

## 2022-02-22 NOTE — PHYSICAL EXAM
[Normal] : affect appropriate [de-identified] : RRR, S1,S2, murmur [de-identified] : warm, dry, pale, without ecchymoses or petechiae

## 2022-02-22 NOTE — REVIEW OF SYSTEMS
[Negative] : Allergic/Immunologic [Fever] : no fever [Chills] : no chills [Night Sweats] : no night sweats [Fatigue] : no fatigue [Recent Change In Weight] : ~T no recent weight change [Chest Pain] : no chest pain [Palpitations] : no palpitations [Shortness Of Breath] : no shortness of breath [Abdominal Pain] : no abdominal pain [Joint Pain] : no joint pain [Skin Rash] : no skin rash [Easy Bleeding] : no tendency for easy bleeding [Easy Bruising] : no tendency for easy bruising

## 2022-02-22 NOTE — HISTORY OF PRESENT ILLNESS
[de-identified] : Patient is a 67 year old female with a history of osteoporosis, pericarditis, mitral valve prolapse, heart murmur, Hashimoto's thyroiditis, anemia, and chronic intermittent leukopenia, who now presents hematologic follow-up. Patient receives Prolia injections every six months for her osteoporosis. Blood results from her last visit in October 2021 revealed a white blood count of 4.35, a hemoglobin of 10.4, hematocrit of 32.9, a normal platelet count of 234,000, and a normal differential. MCV was slightly elevated at 100.3. The comprehensive metabolic panel was completely normal. B12 was elevated at 1680, and patient has since decreased the amount of B12 supplements she takes. Iron studies were normal. Since the last visit, patient was found to have a low TSH and had her Synthroid dosage decreased. She is feeling generally well at this time, with no acute complaints. Denies fatigue, recent infections, fever, chills, or sweats.

## 2022-02-23 LAB
ALBUMIN SERPL ELPH-MCNC: 5 G/DL
ALP BLD-CCNC: 47 U/L
ALT SERPL-CCNC: 18 U/L
ANION GAP SERPL CALC-SCNC: 15 MMOL/L
ANISOCYTOSIS BLD QL: SLIGHT
AST SERPL-CCNC: 29 U/L
BASOPHILS # BLD AUTO: 0 K/UL
BASOPHILS # BLD AUTO: 0 K/UL
BASOPHILS NFR BLD AUTO: 0 %
BASOPHILS NFR BLD AUTO: 0 %
BILIRUB SERPL-MCNC: 0.3 MG/DL
BUN SERPL-MCNC: 22 MG/DL
CALCIUM SERPL-MCNC: 9.7 MG/DL
CHLORIDE SERPL-SCNC: 99 MMOL/L
CO2 SERPL-SCNC: 24 MMOL/L
CREAT SERPL-MCNC: 0.68 MG/DL
EOSINOPHIL # BLD AUTO: 0.21 K/UL
EOSINOPHIL # BLD AUTO: 0.21 K/UL
EOSINOPHIL NFR BLD AUTO: 5.3 %
EOSINOPHIL NFR BLD AUTO: 5.3 %
FERRITIN SERPL-MCNC: 69 NG/ML
FOLATE SERPL-MCNC: >20 NG/ML
GIANT PLATELETS BLD QL SMEAR: PRESENT
GLUCOSE SERPL-MCNC: 76 MG/DL
HAPTOGLOB SERPL-MCNC: 72 MG/DL
HCT VFR BLD CALC: 33.5 %
HGB BLD-MCNC: 10.4 G/DL
HYPOCHROMIA BLD QL SMEAR: SLIGHT
IRON SATN MFR SERPL: 26 %
IRON SERPL-MCNC: 78 UG/DL
LDH SERPL-CCNC: 223 U/L
LYMPHOCYTES # BLD AUTO: 1.26 K/UL
LYMPHOCYTES # BLD AUTO: 1.26 K/UL
LYMPHOCYTES NFR BLD AUTO: 31.9 %
LYMPHOCYTES NFR BLD AUTO: 31.9 %
MACROCYTES BLD QL SMEAR: SLIGHT
MAN DIFF?: NORMAL
MCHC RBC-ENTMCNC: 31 GM/DL
MCHC RBC-ENTMCNC: 31.1 PG
MCV RBC AUTO: 100.3 FL
MONOCYTES # BLD AUTO: 0.17 K/UL
MONOCYTES # BLD AUTO: 0.17 K/UL
MONOCYTES NFR BLD AUTO: 4.4 %
MONOCYTES NFR BLD AUTO: 4.4 %
MSMEAR: NORMAL
NEUTROPHILS # BLD AUTO: 2.31 K/UL
NEUTROPHILS # BLD AUTO: 2.31 K/UL
NEUTROPHILS NFR BLD AUTO: 58.4 %
NEUTROPHILS NFR BLD AUTO: 58.4 %
OVALOCYTES BLD QL SMEAR: SLIGHT
PLAT MORPH BLD: NORMAL
PLATELET # BLD AUTO: 208 K/UL
POIKILOCYTOSIS BLD QL SMEAR: SLIGHT
POLYCHROMASIA BLD QL SMEAR: SLIGHT
POTASSIUM SERPL-SCNC: 4.2 MMOL/L
PROT SERPL-MCNC: 7.1 G/DL
RBC # BLD: 3.34 M/UL
RBC # FLD: 11.6 %
RBC BLD AUTO: ABNORMAL
SODIUM SERPL-SCNC: 138 MMOL/L
TIBC SERPL-MCNC: 301 UG/DL
UIBC SERPL-MCNC: 224 UG/DL
VIT B12 SERPL-MCNC: 1106 PG/ML
WBC # FLD AUTO: 3.96 K/UL

## 2022-02-24 ENCOUNTER — NON-APPOINTMENT (OUTPATIENT)
Age: 67
End: 2022-02-24

## 2022-05-24 ENCOUNTER — APPOINTMENT (OUTPATIENT)
Dept: HEMATOLOGY ONCOLOGY | Facility: CLINIC | Age: 67
End: 2022-05-24
Payer: MEDICARE

## 2022-05-24 VITALS
HEIGHT: 70 IN | OXYGEN SATURATION: 99 % | HEART RATE: 75 BPM | DIASTOLIC BLOOD PRESSURE: 64 MMHG | BODY MASS INDEX: 19.47 KG/M2 | WEIGHT: 136 LBS | TEMPERATURE: 97.1 F | SYSTOLIC BLOOD PRESSURE: 102 MMHG

## 2022-05-24 PROCEDURE — 36415 COLL VENOUS BLD VENIPUNCTURE: CPT

## 2022-05-24 PROCEDURE — 99214 OFFICE O/P EST MOD 30 MIN: CPT | Mod: 25

## 2022-05-24 NOTE — REVIEW OF SYSTEMS
[Fatigue] : fatigue [Negative] : Allergic/Immunologic [Fever] : no fever [Chills] : no chills [Night Sweats] : no night sweats [Recent Change In Weight] : ~T no recent weight change [Chest Pain] : no chest pain [Shortness Of Breath] : no shortness of breath [Abdominal Pain] : no abdominal pain [Joint Pain] : no joint pain [Skin Rash] : no skin rash [Easy Bleeding] : no tendency for easy bleeding [Easy Bruising] : no tendency for easy bruising [FreeTextEntry2] : Exercising more

## 2022-05-24 NOTE — PHYSICAL EXAM
[Normal] : affect appropriate [de-identified] : RRR, S1,S2, murmur [de-identified] : warm, dry, pale, without ecchymoses or petechiae

## 2022-05-24 NOTE — ASSESSMENT
[FreeTextEntry1] : \par Patient is a 67 year old female with a history of osteoporosis, pericarditis, mitral valve prolapse, heart murmur, Hashimoto's thyroiditis, anemia, and chronic intermittent leukopenia, who now presents for hematologic follow-up. Anemia is likely due to her immune disorder/other chronic illness, and has remained stable for years. Leukopenia is intermittent and the white blood count has been essentially normal for some time. However, myelodysplasia remains a possibility. Bone marrow studies have been discussed, but patient feels generally well and wishes to continue with blood count monitoring for the time being. Have ordered CBC, manual differential, reticulocyte count, sedimentation rate, comprehensive metabolic panel, iron panel, ferritin, B12, folate, LDH, and haptoglobin.. Venipuncture was performed today at the office. Patient was advised to call office to discuss results and next appointment date.  A copy of the results will be sent to Dr. Kidd.\par  \par

## 2022-05-24 NOTE — HISTORY OF PRESENT ILLNESS
[de-identified] : \par Patient is a 67 year old female with a history of osteoporosis, pericarditis, mitral valve prolapse, heart murmur, Hashimoto's thyroiditis, anemia, and chronic intermittent leukopenia, who now presents hematologic follow-up. Patient receives Prolia injections every six months for her osteoporosis.  Her last treatment was on May 10th.  At the last visit, the CBC revealed a white count that was normal at 3.96, a hemoglobin that is the same at 10.4 a hematocrit of 33.5, and MCV of 100.3 and a platelet count of 208,000.This is stable for the patient.The comprehensive metabolic panel was completely normal, as was the LDH, haptoglobin, B12, folate, iron panel, and ferritin.  Patient continues to take B12 four times per week.  The patient recently saw her endocrinologist and stated her TSH and T4 are much improved on the adjusted lower dose of Synthroid to 112 mcg.. She is feeling generally well at this time, with no acute complaints. Denies fatigue, recent infections, fever, chills, or sweats.  She has not yet received her second COVID booster vaccine but plans to do so in the near future.\par \par

## 2022-05-24 NOTE — REASON FOR VISIT
[Follow-Up Visit] : a follow-up visit for [FreeTextEntry2] : Leukopenia, anemia of chronic illness, fatigue

## 2022-05-25 LAB
ALBUMIN SERPL ELPH-MCNC: 4.8 G/DL
ALP BLD-CCNC: 56 U/L
ALT SERPL-CCNC: 16 U/L
ANION GAP SERPL CALC-SCNC: 11 MMOL/L
AST SERPL-CCNC: 28 U/L
BASOPHILS # BLD AUTO: 0.1 K/UL
BASOPHILS # BLD AUTO: 0.1 K/UL
BASOPHILS NFR BLD AUTO: 2.7 %
BASOPHILS NFR BLD AUTO: 2.7 %
BILIRUB SERPL-MCNC: 0.3 MG/DL
BUN SERPL-MCNC: 21 MG/DL
CALCIUM SERPL-MCNC: 9.9 MG/DL
CHLORIDE SERPL-SCNC: 99 MMOL/L
CO2 SERPL-SCNC: 25 MMOL/L
CREAT SERPL-MCNC: 0.7 MG/DL
EGFR: 94 ML/MIN/1.73M2
EOSINOPHIL # BLD AUTO: 0.24 K/UL
EOSINOPHIL # BLD AUTO: 0.24 K/UL
EOSINOPHIL NFR BLD AUTO: 6.2 %
EOSINOPHIL NFR BLD AUTO: 6.2 %
ERYTHROCYTE [SEDIMENTATION RATE] IN BLOOD BY WESTERGREN METHOD: 18 MM/HR
FERRITIN SERPL-MCNC: 88 NG/ML
FOLATE SERPL-MCNC: >20 NG/ML
GIANT PLATELETS BLD QL SMEAR: PRESENT
GLUCOSE SERPL-MCNC: 79 MG/DL
HAPTOGLOB SERPL-MCNC: 72 MG/DL
HCT VFR BLD CALC: 31.7 %
HGB BLD-MCNC: 10.3 G/DL
HYPOCHROMIA BLD QL SMEAR: SLIGHT
IRON SATN MFR SERPL: 23 %
IRON SERPL-MCNC: 65 UG/DL
LDH SERPL-CCNC: 248 U/L
LYMPHOCYTES # BLD AUTO: 1.27 K/UL
LYMPHOCYTES # BLD AUTO: 1.27 K/UL
LYMPHOCYTES NFR BLD AUTO: 32.7 %
LYMPHOCYTES NFR BLD AUTO: 32.7 %
MAN DIFF?: NORMAL
MCHC RBC-ENTMCNC: 32 PG
MCHC RBC-ENTMCNC: 32.5 GM/DL
MCV RBC AUTO: 98.4 FL
MONOCYTES # BLD AUTO: 0.17 K/UL
MONOCYTES # BLD AUTO: 0.17 K/UL
MONOCYTES NFR BLD AUTO: 4.4 %
MONOCYTES NFR BLD AUTO: 4.4 %
MSMEAR: NORMAL
NEUTROPHILS # BLD AUTO: 2.09 K/UL
NEUTROPHILS # BLD AUTO: 2.09 K/UL
NEUTROPHILS NFR BLD AUTO: 54 %
NEUTROPHILS NFR BLD AUTO: 54 %
PLAT MORPH BLD: ABNORMAL
PLATELET # BLD AUTO: 199 K/UL
POIKILOCYTOSIS BLD QL SMEAR: SLIGHT
POTASSIUM SERPL-SCNC: 4.5 MMOL/L
PROT SERPL-MCNC: 7.2 G/DL
RBC # BLD: 3.22 M/UL
RBC # BLD: 3.22 M/UL
RBC # FLD: 11.6 %
RBC BLD AUTO: ABNORMAL
RETICS # AUTO: 1.1 %
RETICS AGGREG/RBC NFR: 34.5 K/UL
SODIUM SERPL-SCNC: 135 MMOL/L
SPHEROCYTES BLD QL SMEAR: SLIGHT
TIBC SERPL-MCNC: 279 UG/DL
UIBC SERPL-MCNC: 213 UG/DL
VIT B12 SERPL-MCNC: 1022 PG/ML
WBC # FLD AUTO: 3.87 K/UL

## 2022-05-26 ENCOUNTER — NON-APPOINTMENT (OUTPATIENT)
Age: 67
End: 2022-05-26

## 2022-06-29 ENCOUNTER — NON-APPOINTMENT (OUTPATIENT)
Age: 67
End: 2022-06-29

## 2022-06-29 ENCOUNTER — APPOINTMENT (OUTPATIENT)
Dept: OPHTHALMOLOGY | Facility: CLINIC | Age: 67
End: 2022-06-29

## 2022-06-29 PROCEDURE — 92014 COMPRE OPH EXAM EST PT 1/>: CPT

## 2022-06-29 PROCEDURE — 92250 FUNDUS PHOTOGRAPHY W/I&R: CPT

## 2022-08-23 ENCOUNTER — APPOINTMENT (OUTPATIENT)
Dept: HEMATOLOGY ONCOLOGY | Facility: CLINIC | Age: 67
End: 2022-08-23

## 2022-08-23 VITALS
DIASTOLIC BLOOD PRESSURE: 64 MMHG | WEIGHT: 135 LBS | BODY MASS INDEX: 19.33 KG/M2 | SYSTOLIC BLOOD PRESSURE: 122 MMHG | HEIGHT: 70 IN | TEMPERATURE: 97.3 F | HEART RATE: 79 BPM | OXYGEN SATURATION: 98 %

## 2022-08-23 PROCEDURE — 99214 OFFICE O/P EST MOD 30 MIN: CPT | Mod: 25

## 2022-08-23 PROCEDURE — 36415 COLL VENOUS BLD VENIPUNCTURE: CPT

## 2022-08-23 NOTE — ASSESSMENT
[FreeTextEntry1] : \par \par Patient is a 67 year old female with a history of osteoporosis, pericarditis, mitral valve prolapse, heart murmur, Hashimoto's thyroiditis, anemia, and chronic intermittent leukopenia, who now presents for hematologic follow-up. Anemia is likely due to her immune disorder/other chronic illness, and has remained stable for years. Leukopenia is intermittent and the white blood count has been essentially in the low end of normal for some time.  Patient notes that the white count is usually below the normal when the specimen is obtained fasting.  However, myelodysplasia remains a possibility. Bone marrow studies have been discussed, but patient feels generally well and wishes to continue with blood count monitoring for the time being. Have ordered CBC, manual differential, reticulocyte count, sedimentation rate, comprehensive metabolic panel, iron panel, ferritin, B12, folate, LDH, and haptoglobin.. Venipuncture was performed today at the office. Patient was advised to call office to discuss results and next appointment date. A copy of the results will be sent to Dr. Kidd.\par  \par  \par

## 2022-08-23 NOTE — PHYSICAL EXAM
[Normal] : affect appropriate [de-identified] : RRR, S1,S2, murmur [de-identified] : warm, dry, pale, without ecchymoses or petechiae

## 2022-08-23 NOTE — REVIEW OF SYSTEMS
[Fatigue] : fatigue [Negative] : Allergic/Immunologic [Fever] : no fever [Chills] : no chills [Night Sweats] : no night sweats [Recent Change In Weight] : ~T no recent weight change [Chest Pain] : no chest pain [Palpitations] : no palpitations [Shortness Of Breath] : no shortness of breath [Abdominal Pain] : no abdominal pain [Joint Pain] : no joint pain [Joint Stiffness] : no joint stiffness [Skin Rash] : no skin rash [Easy Bleeding] : no tendency for easy bleeding [Easy Bruising] : no tendency for easy bruising

## 2022-08-23 NOTE — HISTORY OF PRESENT ILLNESS
[de-identified] : Patient is a 67 year old female with a history of osteoporosis, pericarditis, mitral valve prolapse, heart murmur, Hashimoto's thyroiditis, anemia, and chronic intermittent leukopenia, who now presents hematologic follow-up. Patient receives Prolia injections every six months for her osteoporosis. Her last treatment was on May 10th. Blood results from the last visit revealed a very stable CBC with a white blood count of 3.87, hemoglobin 10.3, hematocrit 31.7, and platelet count 199,000. The sed rate was normal at 18. Comprehensive metabolic panel was normal. B12, folate, iron panel, and ferritin were normal. LDH was 248 and haptoglobin was normal at 72. The patient had  blood tests performed with  (PCP/cardiologist) in June at which time the white blood count was 2.7, hematocrit was 33.1 and platelet count of 210,000.  States that an EKG and echocardiogram at that time were unremarkable and all was stable.  A recent mammogram and GYN exam were also normal.  She has been working with a nutritionist and has eliminated red meat from her diet. Since the last visit, the patient complains of occasional fatigue, otherwise, feels generally well with no acute complains. Denies fever, chills, sweats, chest pain, palpitation, shortness of breath, rashes, bruises and recent infections.

## 2022-08-23 NOTE — REASON FOR VISIT
[Follow-Up Visit] : a follow-up visit for [FreeTextEntry2] : Anemia of chronic illness, leukopenia, fatigue

## 2022-08-24 LAB
ALBUMIN SERPL ELPH-MCNC: 4.9 G/DL
ALP BLD-CCNC: 62 U/L
ALT SERPL-CCNC: 23 U/L
ANION GAP SERPL CALC-SCNC: 14 MMOL/L
AST SERPL-CCNC: 31 U/L
BASOPHILS # BLD AUTO: 0.07 K/UL
BASOPHILS # BLD AUTO: 0.07 K/UL
BASOPHILS NFR BLD AUTO: 1.7 %
BASOPHILS NFR BLD AUTO: 1.7 %
BILIRUB SERPL-MCNC: 0.3 MG/DL
BUN SERPL-MCNC: 18 MG/DL
CALCIUM SERPL-MCNC: 10.1 MG/DL
CHLORIDE SERPL-SCNC: 99 MMOL/L
CO2 SERPL-SCNC: 24 MMOL/L
CREAT SERPL-MCNC: 0.71 MG/DL
EGFR: 93 ML/MIN/1.73M2
EOSINOPHIL # BLD AUTO: 0.15 K/UL
EOSINOPHIL # BLD AUTO: 0.15 K/UL
EOSINOPHIL NFR BLD AUTO: 3.5 %
EOSINOPHIL NFR BLD AUTO: 3.5 %
ERYTHROCYTE [SEDIMENTATION RATE] IN BLOOD BY WESTERGREN METHOD: 19 MM/HR
FERRITIN SERPL-MCNC: 98 NG/ML
FOLATE SERPL-MCNC: >20 NG/ML
GLUCOSE SERPL-MCNC: 65 MG/DL
HAPTOGLOB SERPL-MCNC: 82 MG/DL
HCT VFR BLD CALC: 32.2 %
HGB BLD-MCNC: 10.4 G/DL
IRON SATN MFR SERPL: 21 %
IRON SERPL-MCNC: 66 UG/DL
LDH SERPL-CCNC: 280 U/L
LYMPHOCYTES # BLD AUTO: 1.89 K/UL
LYMPHOCYTES # BLD AUTO: 1.89 K/UL
LYMPHOCYTES NFR BLD AUTO: 44.4 %
LYMPHOCYTES NFR BLD AUTO: 44.4 %
MAN DIFF?: NORMAL
MCHC RBC-ENTMCNC: 31.8 PG
MCHC RBC-ENTMCNC: 32.3 GM/DL
MCV RBC AUTO: 98.5 FL
MONOCYTES # BLD AUTO: 0.11 K/UL
MONOCYTES # BLD AUTO: 0.11 K/UL
MONOCYTES NFR BLD AUTO: 2.6 %
MONOCYTES NFR BLD AUTO: 2.6 %
MSMEAR: NORMAL
NEUTROPHILS # BLD AUTO: 2.04 K/UL
NEUTROPHILS # BLD AUTO: 2.04 K/UL
NEUTROPHILS NFR BLD AUTO: 47.8 %
NEUTROPHILS NFR BLD AUTO: 47.8 %
PLAT MORPH BLD: NORMAL
PLATELET # BLD AUTO: 260 K/UL
POTASSIUM SERPL-SCNC: 4.1 MMOL/L
PROT SERPL-MCNC: 7.1 G/DL
RBC # BLD: 3.27 M/UL
RBC # BLD: 3.27 M/UL
RBC # FLD: 11.6 %
RBC BLD AUTO: ABNORMAL
RETICS # AUTO: 1 %
RETICS AGGREG/RBC NFR: 31.1 K/UL
SODIUM SERPL-SCNC: 137 MMOL/L
SPHEROCYTES BLD QL SMEAR: NORMAL
TIBC SERPL-MCNC: 312 UG/DL
UIBC SERPL-MCNC: 245 UG/DL
VIT B12 SERPL-MCNC: 1109 PG/ML
WBC # FLD AUTO: 4.26 K/UL

## 2022-08-25 ENCOUNTER — NON-APPOINTMENT (OUTPATIENT)
Age: 67
End: 2022-08-25

## 2022-10-06 ENCOUNTER — NON-APPOINTMENT (OUTPATIENT)
Age: 67
End: 2022-10-06

## 2022-11-15 ENCOUNTER — APPOINTMENT (OUTPATIENT)
Dept: HEMATOLOGY ONCOLOGY | Facility: CLINIC | Age: 67
End: 2022-11-15

## 2022-11-15 VITALS
WEIGHT: 130 LBS | OXYGEN SATURATION: 96 % | BODY MASS INDEX: 18.61 KG/M2 | DIASTOLIC BLOOD PRESSURE: 70 MMHG | HEIGHT: 70 IN | TEMPERATURE: 97.3 F | HEART RATE: 71 BPM | SYSTOLIC BLOOD PRESSURE: 120 MMHG

## 2022-11-15 PROCEDURE — 99214 OFFICE O/P EST MOD 30 MIN: CPT | Mod: 25

## 2022-11-15 PROCEDURE — 36415 COLL VENOUS BLD VENIPUNCTURE: CPT

## 2022-11-15 PROCEDURE — G0008: CPT

## 2022-11-15 PROCEDURE — 90662 IIV NO PRSV INCREASED AG IM: CPT

## 2022-11-15 NOTE — PHYSICAL EXAM
[Thin] : thin [Normal] : affect appropriate [de-identified] : RRR, S1,S2, murmur [de-identified] : warm, dry, pale, without ecchymoses or petechiae

## 2022-11-15 NOTE — REASON FOR VISIT
[Follow-Up Visit] : a follow-up visit for [FreeTextEntry2] : [place] of chronic illness, fatigue, leukopenia

## 2022-11-15 NOTE — ASSESSMENT
[FreeTextEntry1] : Patient is a 67 year old female with a history of osteoporosis, pericarditis, mitral valve prolapse, heart murmur, Hashimoto's thyroiditis, anemia, and chronic intermittent leukopenia, who now presents for hematologic follow-up. Anemia is likely due to her immune disorder/other chronic illness, and has remained stable for years. Leukopenia is intermittent and the white blood count has been essentially in the low end of normal for some time. Patient notes that the white count is usually below the normal when the specimen is obtained fasting. However, myelodysplasia remains a possibility. Bone marrow studies have been discussed, but patient feels generally well and wishes to continue with blood count monitoring for the time being. Have ordered CBC with differential, FERNANDA, manual differential, B12 and folate, reticulocyte count, CMP, sed rate, ferritin, haptoglobin, iron panel, LDH, rheumatoid factor quant.  Venipuncture was performed today at the office. Fluzone Quadrivalent 0.7 mL (high dose) has been administered intramuscularly to the left deltoid without incident. Patient was advised to call office to discuss results and next appointment date.

## 2022-11-15 NOTE — HISTORY OF PRESENT ILLNESS
[de-identified] : Patient is a 67 year old female with a history of osteoporosis, pericarditis, mitral valve prolapse, heart murmur, Hashimoto's thyroiditis, anemia, and chronic intermittent leukopenia, who now presents hematologic follow-up.   Blood results from the last visit revealed a stable CBC with a normal white count of 4.26, hemoglobin low but stable at 10.4, hematocrit 32.2 and a platelet count of 260,000. B12 and folate were normal. Iron panel and ferritin were also normal. LDH was slightly elevated at 280 but haptoglobin was normal at 82. Sed rate was 19. Comprehensive metabolic panel was significant for a glucose of 65 which may be artifactual as patient displayed no signs of hypoglycemia.She has eliminated red meat from her diet. Since the last visit, the patient states that she visited Dr. Kidd, her PCP/cardiologist and results were stable other than leukopenia which usually occurs when patient is fasting prior to blood draw..  Today, the patient complains of occasional fatigue but otherwise feels generally well with no acute complains. Denies fever, chills, sweats, chest pain, palpitation, shortness of breath, rashes, bruises and recent infections. Of note, Patient also requests the flu vaccine today. Patient received the bivalent COVID vaccine in September.

## 2022-11-15 NOTE — REVIEW OF SYSTEMS
[Fatigue] : fatigue [Fever] : no fever [Chills] : no chills [Night Sweats] : no night sweats [Recent Change In Weight] : ~T no recent weight change [Chest Pain] : no chest pain [Shortness Of Breath] : no shortness of breath [Abdominal Pain] : no abdominal pain [Joint Pain] : no joint pain [Skin Rash] : no skin rash [Easy Bleeding] : no tendency for easy bleeding [Easy Bruising] : no tendency for easy bruising [Negative] : Allergic/Immunologic

## 2022-11-16 LAB
ALBUMIN SERPL ELPH-MCNC: 4.6 G/DL
ALP BLD-CCNC: 58 U/L
ALT SERPL-CCNC: 17 U/L
ANION GAP SERPL CALC-SCNC: 11 MMOL/L
ANISOCYTOSIS BLD QL: SLIGHT
AST SERPL-CCNC: 28 U/L
BASOPHILS # BLD AUTO: 0 K/UL
BASOPHILS # BLD AUTO: 0 K/UL
BASOPHILS NFR BLD AUTO: 0 %
BASOPHILS NFR BLD AUTO: 0 %
BILIRUB SERPL-MCNC: 0.2 MG/DL
BUN SERPL-MCNC: 21 MG/DL
CALCIUM SERPL-MCNC: 10 MG/DL
CHLORIDE SERPL-SCNC: 99 MMOL/L
CO2 SERPL-SCNC: 27 MMOL/L
CREAT SERPL-MCNC: 0.62 MG/DL
EGFR: 97 ML/MIN/1.73M2
EOSINOPHIL # BLD AUTO: 0.08 K/UL
EOSINOPHIL # BLD AUTO: 0.08 K/UL
EOSINOPHIL NFR BLD AUTO: 1.8 %
EOSINOPHIL NFR BLD AUTO: 1.8 %
ERYTHROCYTE [SEDIMENTATION RATE] IN BLOOD BY WESTERGREN METHOD: 26 MM/HR
FERRITIN SERPL-MCNC: 83 NG/ML
FOLATE SERPL-MCNC: >20 NG/ML
GIANT PLATELETS BLD QL SMEAR: PRESENT
GLUCOSE SERPL-MCNC: 75 MG/DL
HAPTOGLOB SERPL-MCNC: 84 MG/DL
HCT VFR BLD CALC: 32.3 %
HGB BLD-MCNC: 10.3 G/DL
HYPOCHROMIA BLD QL SMEAR: SLIGHT
IRON SATN MFR SERPL: 16 %
IRON SERPL-MCNC: 52 UG/DL
LDH SERPL-CCNC: 241 U/L
LYMPHOCYTES # BLD AUTO: 1.33 K/UL
LYMPHOCYTES # BLD AUTO: 1.33 K/UL
LYMPHOCYTES NFR BLD AUTO: 30.4 %
LYMPHOCYTES NFR BLD AUTO: 30.4 %
MACROCYTES BLD QL SMEAR: SLIGHT
MAN DIFF?: NORMAL
MCHC RBC-ENTMCNC: 31.9 GM/DL
MCHC RBC-ENTMCNC: 32.1 PG
MCV RBC AUTO: 100.6 FL
MONOCYTES # BLD AUTO: 0.34 K/UL
MONOCYTES # BLD AUTO: 0.34 K/UL
MONOCYTES NFR BLD AUTO: 7.8 %
MONOCYTES NFR BLD AUTO: 7.8 %
MSMEAR: NORMAL
NEUTROPHILS # BLD AUTO: 2.62 K/UL
NEUTROPHILS # BLD AUTO: 2.62 K/UL
NEUTROPHILS NFR BLD AUTO: 56.5 %
NEUTROPHILS NFR BLD AUTO: 56.5 %
NEUTS BAND NFR BLD MANUAL: 3.5 %
PLAT MORPH BLD: ABNORMAL
PLATELET # BLD AUTO: 217 K/UL
POIKILOCYTOSIS BLD QL SMEAR: SLIGHT
POLYCHROMASIA BLD QL SMEAR: SLIGHT
POTASSIUM SERPL-SCNC: 4.8 MMOL/L
PROT SERPL-MCNC: 7.1 G/DL
RBC # BLD: 3.21 M/UL
RBC # BLD: 3.21 M/UL
RBC # FLD: 11.9 %
RBC BLD AUTO: ABNORMAL
RETICS # AUTO: 0.9 %
RETICS AGGREG/RBC NFR: 29.9 K/UL
RHEUMATOID FACT SER QL: <10 IU/ML
SODIUM SERPL-SCNC: 137 MMOL/L
SPHEROCYTES BLD QL SMEAR: SLIGHT
TIBC SERPL-MCNC: 325 UG/DL
UIBC SERPL-MCNC: 273 UG/DL
VIT B12 SERPL-MCNC: 936 PG/ML
WBC # FLD AUTO: 4.36 K/UL

## 2022-11-18 LAB — ANA SER IF-ACNC: NEGATIVE

## 2022-11-22 ENCOUNTER — NON-APPOINTMENT (OUTPATIENT)
Age: 67
End: 2022-11-22

## 2023-02-21 ENCOUNTER — APPOINTMENT (OUTPATIENT)
Dept: HEMATOLOGY ONCOLOGY | Facility: CLINIC | Age: 68
End: 2023-02-21
Payer: MEDICARE

## 2023-02-21 VITALS
OXYGEN SATURATION: 99 % | TEMPERATURE: 97.6 F | HEIGHT: 70 IN | SYSTOLIC BLOOD PRESSURE: 118 MMHG | BODY MASS INDEX: 18.9 KG/M2 | HEART RATE: 70 BPM | WEIGHT: 132 LBS | DIASTOLIC BLOOD PRESSURE: 58 MMHG

## 2023-02-21 PROCEDURE — 36415 COLL VENOUS BLD VENIPUNCTURE: CPT

## 2023-02-21 PROCEDURE — 99214 OFFICE O/P EST MOD 30 MIN: CPT | Mod: 25

## 2023-02-21 NOTE — REASON FOR VISIT
[Follow-Up Visit] : a follow-up visit for [FreeTextEntry2] : Macrocytic anemia, anemia due to chronic illness, leukopenia

## 2023-02-21 NOTE — HISTORY OF PRESENT ILLNESS
[de-identified] : Patient is a 68 year old female with a history of osteoporosis, pericarditis, mitral valve prolapse, heart murmur, Hashimoto's thyroiditis, anemia, and chronic intermittent leukopenia, who now presents hematologic follow-up. Blood results from the last visit revealed that the CBC was again significant for a stable hemoglobin at 10.3, hematocrit of 32.3, MCV of 100.6, WBC was normal at 4.36 with a normal differential and the platelet count was normal at 217,000. The comprehensive metabolic panel was completely normal. B12 was 936, folate greater than 20. Iron panel was normal but with a saturation at the low end at 16% and the ferritin was 83. LDH, haptoglobin, rheumatoid factor, sed rate, and FERNANDA were normal or negative. Today, the patient complains of occasional fatigue but otherwise feels generally well with no acute complaints. She denies fever, chills, sweats, chest pain, palpitation, shortness of breath, rashes, bruises and recent infections.

## 2023-02-21 NOTE — REVIEW OF SYSTEMS
[Fatigue] : fatigue [Negative] : Allergic/Immunologic [Fever] : no fever [Chills] : no chills [Night Sweats] : no night sweats [Recent Change In Weight] : ~T no recent weight change [Vision Problems] : no vision problems [Nosebleeds] : no nosebleeds [Chest Pain] : no chest pain [Shortness Of Breath] : no shortness of breath [Abdominal Pain] : no abdominal pain [Joint Stiffness] : no joint stiffness [Skin Rash] : no skin rash [Dizziness] : no dizziness [Easy Bleeding] : no tendency for easy bleeding [Easy Bruising] : no tendency for easy bruising

## 2023-02-21 NOTE — PHYSICAL EXAM
[Thin] : thin [Normal] : affect appropriate [de-identified] : RRR, S1,S2, murmur [de-identified] : warm, dry, pale, without ecchymoses or petechiae

## 2023-02-21 NOTE — ASSESSMENT
[FreeTextEntry1] : Patient is a 68 year old female with a history of osteoporosis, pericarditis, mitral valve prolapse, heart murmur, Hashimoto's thyroiditis, anemia, and chronic intermittent leukopenia, who now presents for hematologic follow-up. Anemia is likely due to her immune disorder/other chronic illness, and has remained stable for years. Leukopenia is intermittent and the white blood count has been essentially in the low end of normal for some time. Patient notes that the white count is usually below the normal when the specimen is obtained fasting. However, myelodysplasia remains a possibility. Bone marrow studies have been discussed, but patient feels generally well and wishes to continue with blood count monitoring for the time being. Have ordered CBC with differential, FERNANDA, CMP, ferritin, iron panel, manual differential, reticulocyte count, rheumatoid factor quant, sed rate, B12 and folate, haptoglobin, and LDH. Venipuncture was performed today at the office. Patient was advised to call office to discuss results and next appointment date.

## 2023-02-22 LAB
ACANTHOCYTES BLD QL SMEAR: SLIGHT
ALBUMIN SERPL ELPH-MCNC: 4.6 G/DL
ALP BLD-CCNC: 52 U/L
ALT SERPL-CCNC: 20 U/L
ANION GAP SERPL CALC-SCNC: 13 MMOL/L
AST SERPL-CCNC: 30 U/L
BASOPHILS # BLD AUTO: 0.07 K/UL
BASOPHILS # BLD AUTO: 0.07 K/UL
BASOPHILS NFR BLD AUTO: 1.8 %
BASOPHILS NFR BLD AUTO: 1.8 %
BILIRUB SERPL-MCNC: 0.2 MG/DL
BUN SERPL-MCNC: 19 MG/DL
CALCIUM SERPL-MCNC: 10 MG/DL
CHLORIDE SERPL-SCNC: 96 MMOL/L
CO2 SERPL-SCNC: 24 MMOL/L
CREAT SERPL-MCNC: 0.67 MG/DL
EGFR: 95 ML/MIN/1.73M2
EOSINOPHIL # BLD AUTO: 0.07 K/UL
EOSINOPHIL # BLD AUTO: 0.07 K/UL
EOSINOPHIL NFR BLD AUTO: 1.8 %
EOSINOPHIL NFR BLD AUTO: 1.8 %
ERYTHROCYTE [SEDIMENTATION RATE] IN BLOOD BY WESTERGREN METHOD: 20 MM/HR
FERRITIN SERPL-MCNC: 77 NG/ML
FOLATE SERPL-MCNC: >20 NG/ML
GIANT PLATELETS BLD QL SMEAR: PRESENT
GLUCOSE SERPL-MCNC: 75 MG/DL
HAPTOGLOB SERPL-MCNC: 64 MG/DL
HCT VFR BLD CALC: 31.9 %
HGB BLD-MCNC: 10.3 G/DL
IRON SATN MFR SERPL: 26 %
IRON SERPL-MCNC: 80 UG/DL
LDH SERPL-CCNC: 230 U/L
LYMPHOCYTES # BLD AUTO: 1.1 K/UL
LYMPHOCYTES # BLD AUTO: 1.1 K/UL
LYMPHOCYTES NFR BLD AUTO: 28.9 %
LYMPHOCYTES NFR BLD AUTO: 28.9 %
MAN DIFF?: NORMAL
MCHC RBC-ENTMCNC: 32.2 PG
MCHC RBC-ENTMCNC: 32.3 GM/DL
MCV RBC AUTO: 99.7 FL
MONOCYTES # BLD AUTO: 0.23 K/UL
MONOCYTES # BLD AUTO: 0.23 K/UL
MONOCYTES NFR BLD AUTO: 6.1 %
MONOCYTES NFR BLD AUTO: 6.1 %
MSMEAR: NORMAL
NEUTROPHILS # BLD AUTO: 2.33 K/UL
NEUTROPHILS # BLD AUTO: 2.33 K/UL
NEUTROPHILS NFR BLD AUTO: 58.8 %
NEUTROPHILS NFR BLD AUTO: 58.8 %
NEUTS BAND NFR BLD MANUAL: 2.6 %
OVALOCYTES BLD QL SMEAR: SLIGHT
PLAT MORPH BLD: ABNORMAL
PLATELET # BLD AUTO: 214 K/UL
POIKILOCYTOSIS BLD QL SMEAR: SLIGHT
POLYCHROMASIA BLD QL SMEAR: SLIGHT
POTASSIUM SERPL-SCNC: 4.1 MMOL/L
PROT SERPL-MCNC: 7.2 G/DL
RBC # BLD: 3.2 M/UL
RBC # BLD: 3.2 M/UL
RBC # FLD: 11.5 %
RBC BLD AUTO: ABNORMAL
RETICS # AUTO: 0.8 %
RETICS AGGREG/RBC NFR: 25.6 K/UL
RHEUMATOID FACT SER QL: <10 IU/ML
SODIUM SERPL-SCNC: 133 MMOL/L
SPHEROCYTES BLD QL SMEAR: NORMAL
TIBC SERPL-MCNC: 313 UG/DL
UIBC SERPL-MCNC: 233 UG/DL
VIT B12 SERPL-MCNC: 927 PG/ML
WBC # FLD AUTO: 3.8 K/UL

## 2023-02-23 ENCOUNTER — NON-APPOINTMENT (OUTPATIENT)
Age: 68
End: 2023-02-23

## 2023-02-24 LAB — ANA SER IF-ACNC: NEGATIVE

## 2023-05-23 ENCOUNTER — APPOINTMENT (OUTPATIENT)
Dept: HEMATOLOGY ONCOLOGY | Facility: CLINIC | Age: 68
End: 2023-05-23
Payer: MEDICARE

## 2023-05-23 VITALS
BODY MASS INDEX: 18.32 KG/M2 | WEIGHT: 128 LBS | SYSTOLIC BLOOD PRESSURE: 116 MMHG | TEMPERATURE: 96.7 F | OXYGEN SATURATION: 99 % | DIASTOLIC BLOOD PRESSURE: 60 MMHG | HEART RATE: 74 BPM | HEIGHT: 70 IN

## 2023-05-23 VITALS — BODY MASS INDEX: 19.08 KG/M2 | WEIGHT: 133 LBS

## 2023-05-23 PROCEDURE — 36415 COLL VENOUS BLD VENIPUNCTURE: CPT

## 2023-05-23 PROCEDURE — 99214 OFFICE O/P EST MOD 30 MIN: CPT | Mod: 25

## 2023-05-23 NOTE — REASON FOR VISIT
[Follow-Up Visit] : a follow-up visit for [FreeTextEntry2] : Leukopenia, anemia, anemia of chronic illness, macrocytosis fatigue

## 2023-05-23 NOTE — ASSESSMENT
[FreeTextEntry1] : Patient is a 68 year old female with a history of osteoporosis, pericarditis, mitral valve prolapse, heart murmur, Hashimoto's thyroiditis, anemia, and chronic intermittent leukopenia, who now presents for hematologic follow-up. Anemia is likely due to her immune disorder/other chronic illness, and has remained stable for years. Leukopenia is intermittent and the white blood count has been essentially in the low end of normal for some time. Patient notes that the white count is usually below the normal when the specimen is obtained fasting. However, myelodysplasia remains a possibility. Patient feels generally well and wishes to continue with blood count monitoring for the time being. Have ordered B12 and folate, ferritin, Blood bank antibody screen, CBC with differential, Haptoglobin, Direct Karen, CMP, Iron panel, manual differential, reticulocyte count and sed rate. Venipuncture was performed today at the office. Patient was advised to call office to discuss results.

## 2023-05-23 NOTE — REVIEW OF SYSTEMS
[Fatigue] : fatigue [Negative] : Allergic/Immunologic [Chills] : no chills [Night Sweats] : no night sweats [Recent Change In Weight] : ~T no recent weight change [Vision Problems] : no vision problems [Loss of Hearing] : no loss of hearing [Nosebleeds] : no nosebleeds [Chest Pain] : no chest pain [Shortness Of Breath] : no shortness of breath [Abdominal Pain] : no abdominal pain [Joint Pain] : no joint pain [Joint Stiffness] : no joint stiffness [Skin Rash] : no skin rash [Muscle Weakness] : no muscle weakness [Easy Bleeding] : no tendency for easy bleeding [Easy Bruising] : no tendency for easy bruising

## 2023-05-23 NOTE — PHYSICAL EXAM
[Thin] : thin [Normal] : affect appropriate [de-identified] : RRR, S1,S2, murmur [de-identified] : warm, dry, pale, without ecchymoses or petechiae

## 2023-05-23 NOTE — HISTORY OF PRESENT ILLNESS
[de-identified] : Patient is a 68 year old female with a history of osteoporosis, pericarditis, mitral valve prolapse, heart murmur, Hashimoto's thyroiditis, anemia, and chronic intermittent leukopenia, who now presents hematologic follow-up. Blood results from the last visit revealed that the comprehensive metabolic panel was significant only for a slightly low sodium at 133 which patient states she has had before and was advised to have a salty snack from time to time. Patient drinks a great deal of water and perhaps this caused the hyponatremia.   CBC revealed a low normal white count at 3.8 with normal differential, stable hemoglobin at 10.3, hematocrit 31.9 and a platelet count of 214,000. B12, folate, iron panel, ferritin, LDH, and haptoglobin were normal. Rheumatoid factor was normal.The patient saw her endocrinologist in mid April at which time the sodium remained low at 132.  Patient had her last Prolia injection in April. Today, patient is doing generally well with no acute complaints. Patient states that she is currently working on a better sleep schedule. Patient will be having annual checkup in June with her PCP. Denies fever, chills, sweats, chest pain, palpitation, shortness of breath, rashes, bruises or recent infections. \par

## 2023-05-24 LAB
ACANTHOCYTES BLD QL SMEAR: SLIGHT
ALBUMIN SERPL ELPH-MCNC: 4.8 G/DL
ALP BLD-CCNC: 56 U/L
ALT SERPL-CCNC: 21 U/L
ANION GAP SERPL CALC-SCNC: 14 MMOL/L
AST SERPL-CCNC: 34 U/L
BASOPHILS NFR BLD AUTO: 1 %
BILIRUB SERPL-MCNC: 0.2 MG/DL
BLD GP AB SCN SERPL QL: NORMAL
BLD SMEAR INTERP: NORMAL
BUN SERPL-MCNC: 21 MG/DL
CALCIUM SERPL-MCNC: 10.2 MG/DL
CHLORIDE SERPL-SCNC: 99 MMOL/L
CO2 SERPL-SCNC: 26 MMOL/L
CREAT SERPL-MCNC: 0.71 MG/DL
DIRECT COOMBS: NORMAL
EGFR: 92 ML/MIN/1.73M2
EOSINOPHIL NFR BLD AUTO: 1 %
ERYTHROCYTE [SEDIMENTATION RATE] IN BLOOD BY WESTERGREN METHOD: 18 MM/HR
FERRITIN SERPL-MCNC: 85 NG/ML
FOLATE SERPL-MCNC: >20 NG/ML
GLUCOSE SERPL-MCNC: 86 MG/DL
HAPTOGLOB SERPL-MCNC: 71 MG/DL
IRON SATN MFR SERPL: 20 %
IRON SERPL-MCNC: 64 UG/DL
LDH SERPL-CCNC: 328 U/L
LYMPHOCYTES NFR BLD AUTO: 31 %
MONOCYTES NFR BLD AUTO: 7 %
MSMEAR: YES
NEUTROPHILS NFR BLD AUTO: 60 %
OVALOCYTES BLD QL SMEAR: SLIGHT
PLAT MORPH BLD: ABNORMAL
POIKILOCYTOSIS BLD QL SMEAR: SLIGHT
POLYCHROMASIA BLD QL SMEAR: SLIGHT
POTASSIUM SERPL-SCNC: 4.6 MMOL/L
PROT SERPL-MCNC: 7.2 G/DL
RBC # BLD: 3.18 M/UL
RBC BLD AUTO: ABNORMAL
RETICS # AUTO: 0.9 %
RETICS AGGREG/RBC NFR: 29.9 K/UL
SODIUM SERPL-SCNC: 139 MMOL/L
SPHEROCYTES BLD QL SMEAR: NORMAL
TIBC SERPL-MCNC: 321 UG/DL
UIBC SERPL-MCNC: 257 UG/DL
VIT B12 SERPL-MCNC: 1000 PG/ML

## 2023-05-25 ENCOUNTER — NON-APPOINTMENT (OUTPATIENT)
Age: 68
End: 2023-05-25

## 2023-07-19 ENCOUNTER — NON-APPOINTMENT (OUTPATIENT)
Age: 68
End: 2023-07-19

## 2023-07-19 ENCOUNTER — APPOINTMENT (OUTPATIENT)
Dept: OPHTHALMOLOGY | Facility: CLINIC | Age: 68
End: 2023-07-19
Payer: MEDICARE

## 2023-07-19 PROCEDURE — 92014 COMPRE OPH EXAM EST PT 1/>: CPT

## 2023-07-19 PROCEDURE — 92250 FUNDUS PHOTOGRAPHY W/I&R: CPT

## 2023-08-22 ENCOUNTER — APPOINTMENT (OUTPATIENT)
Dept: HEMATOLOGY ONCOLOGY | Facility: CLINIC | Age: 68
End: 2023-08-22
Payer: MEDICARE

## 2023-08-22 VITALS
OXYGEN SATURATION: 99 % | HEART RATE: 70 BPM | TEMPERATURE: 98.1 F | DIASTOLIC BLOOD PRESSURE: 68 MMHG | HEIGHT: 70 IN | WEIGHT: 132 LBS | SYSTOLIC BLOOD PRESSURE: 122 MMHG | BODY MASS INDEX: 18.9 KG/M2

## 2023-08-22 PROCEDURE — 99214 OFFICE O/P EST MOD 30 MIN: CPT | Mod: 25

## 2023-08-22 PROCEDURE — 36415 COLL VENOUS BLD VENIPUNCTURE: CPT

## 2023-08-22 NOTE — PHYSICAL EXAM
[Thin] : thin [Normal] : affect appropriate [de-identified] : RRR, S1,S2, murmur [de-identified] : warm, dry, pale, without ecchymoses or petechiae

## 2023-08-22 NOTE — HISTORY OF PRESENT ILLNESS
[de-identified] : Patient is a 68 year old female with a history of osteoporosis, pericarditis, mitral valve prolapse, heart murmur, Hashimoto's thyroiditis, anemia, and chronic intermittent leukopenia, who now presents hematologic follow-up. Blood results from the last visit in May revealed the CBC was significant for a normal white count at 4.21 with a normal differential, hemoglobin that was low but stable at 10.3, hematocrit of 31.4 and a normal platelet count of 236,000. LDH was elevated to 328 (this was intermittently elevated) and the etiology was unclear. There was no evidence of hemolysis as haptoglobin was normal and direct and indirect Karen testing was negative. Comprehensive metabolic panel, B12, folate, iron panel and ferritin were normal.  Patient saw her PCP Dr. Kidd in early June, at which time the white blood count was 4.1, with normal differential, hemoglobin 10.7, hematocrit 32.3 and platelet count 217,000. B12 was 696 and the chemistries were essentially normal. Patient had her eye exam in the middle of July and reports everything to be normal. She will be scheduling a coronary CT angiogram in the fall. Today, the patient complains of fatigue attributed for loss of sleep but is otherwise feeling generally well. Patient continues to work on a better sleep schedule. Denies fever, chills, sweats, chest pain, palpitation, shortness of breath, rashes, bruises or recent infections.

## 2023-08-22 NOTE — ASSESSMENT
[FreeTextEntry1] : Patient is a 68 year old female with a history of osteoporosis, pericarditis, mitral valve prolapse, heart murmur, Hashimoto's thyroiditis, anemia, and chronic intermittent leukopenia, who now presents for hematologic follow-up.  Anemia is likely due to her immune disorder/other chronic illness and has remained stable for years. Leukopenia is intermittent and the white blood count has been essentially in the low end of normal for some time. Patient notes that the white count is usually below the normal when the specimen is obtained fasting. However, myelodysplasia remains a possibility. Patient feels generally well and wishes to continue with blood count monitoring for the time being.  Have ordered Haptoglobin, LDH, B12 and folate, CBC with differential, CMP, ferritin, iron panel, manual differential, reticulocyte count and sed rate. Venipuncture was performed today at the office. Patient was advised to call office to discuss results.

## 2023-08-22 NOTE — REVIEW OF SYSTEMS
[Fatigue] : fatigue [Negative] : Allergic/Immunologic [Fever] : no fever [Chills] : no chills [Night Sweats] : no night sweats [Recent Change In Weight] : ~T no recent weight change [Vision Problems] : no vision problems [Nosebleeds] : no nosebleeds [Chest Pain] : no chest pain [Shortness Of Breath] : no shortness of breath [Abdominal Pain] : no abdominal pain [Joint Pain] : no joint pain [Skin Rash] : no skin rash [Dizziness] : no dizziness [Easy Bleeding] : no tendency for easy bleeding [Easy Bruising] : no tendency for easy bruising [FreeTextEntry2] : Needs more sleep

## 2023-08-23 LAB
ALBUMIN SERPL ELPH-MCNC: 4.7 G/DL
ALP BLD-CCNC: 59 U/L
ALT SERPL-CCNC: 18 U/L
ANION GAP SERPL CALC-SCNC: 14 MMOL/L
AST SERPL-CCNC: 31 U/L
BASOPHILS # BLD AUTO: 0.04 K/UL
BASOPHILS NFR BLD AUTO: 0.9 %
BILIRUB SERPL-MCNC: 0.2 MG/DL
BUN SERPL-MCNC: 20 MG/DL
CALCIUM SERPL-MCNC: 9.8 MG/DL
CHLORIDE SERPL-SCNC: 97 MMOL/L
CO2 SERPL-SCNC: 24 MMOL/L
CREAT SERPL-MCNC: 0.7 MG/DL
EGFR: 94 ML/MIN/1.73M2
EOSINOPHIL # BLD AUTO: 0.04 K/UL
EOSINOPHIL NFR BLD AUTO: 0.9 %
ERYTHROCYTE [SEDIMENTATION RATE] IN BLOOD BY WESTERGREN METHOD: 15 MM/HR
FERRITIN SERPL-MCNC: 91 NG/ML
FOLATE SERPL-MCNC: >20 NG/ML
GLUCOSE SERPL-MCNC: 91 MG/DL
HAPTOGLOB SERPL-MCNC: 72 MG/DL
IRON SATN MFR SERPL: 24 %
IRON SERPL-MCNC: 74 UG/DL
LDH SERPL-CCNC: 239 U/L
LYMPHOCYTES # BLD AUTO: 1.3 K/UL
LYMPHOCYTES NFR BLD AUTO: 32.7 %
MONOCYTES # BLD AUTO: 0.26 K/UL
MONOCYTES NFR BLD AUTO: 6.4 %
MSMEAR: NORMAL
NEUTROPHILS # BLD AUTO: 2.36 K/UL
NEUTROPHILS NFR BLD AUTO: 59.1 %
OVALOCYTES BLD QL SMEAR: SLIGHT
PLAT MORPH BLD: ABNORMAL
POIKILOCYTOSIS BLD QL SMEAR: SLIGHT
POLYCHROMASIA BLD QL SMEAR: SLIGHT
POTASSIUM SERPL-SCNC: 4.4 MMOL/L
PROT SERPL-MCNC: 7 G/DL
RBC # BLD: 3.19 M/UL
RBC BLD AUTO: ABNORMAL
RETICS # AUTO: 0.9 %
RETICS AGGREG/RBC NFR: 29 K/UL
SCHISTOCYTES BLD QL SMEAR: SLIGHT
SODIUM SERPL-SCNC: 135 MMOL/L
SPHEROCYTES BLD QL SMEAR: SLIGHT
TIBC SERPL-MCNC: 304 UG/DL
UIBC SERPL-MCNC: 230 UG/DL
VIT B12 SERPL-MCNC: 789 PG/ML

## 2023-08-24 ENCOUNTER — NON-APPOINTMENT (OUTPATIENT)
Age: 68
End: 2023-08-24

## 2023-11-14 ENCOUNTER — APPOINTMENT (OUTPATIENT)
Dept: HEMATOLOGY ONCOLOGY | Facility: CLINIC | Age: 68
End: 2023-11-14
Payer: MEDICARE

## 2023-11-14 VITALS
BODY MASS INDEX: 18.32 KG/M2 | TEMPERATURE: 98.3 F | HEIGHT: 70 IN | DIASTOLIC BLOOD PRESSURE: 62 MMHG | HEART RATE: 76 BPM | OXYGEN SATURATION: 97 % | WEIGHT: 128 LBS | SYSTOLIC BLOOD PRESSURE: 122 MMHG

## 2023-11-14 DIAGNOSIS — Z23 ENCOUNTER FOR IMMUNIZATION: ICD-10-CM

## 2023-11-14 PROCEDURE — G0008: CPT

## 2023-11-14 PROCEDURE — 90662 IIV NO PRSV INCREASED AG IM: CPT

## 2023-11-14 PROCEDURE — 99214 OFFICE O/P EST MOD 30 MIN: CPT | Mod: 25

## 2023-11-14 PROCEDURE — 36415 COLL VENOUS BLD VENIPUNCTURE: CPT

## 2023-11-15 LAB
ALBUMIN SERPL ELPH-MCNC: 4.9 G/DL
ALP BLD-CCNC: 59 U/L
ALT SERPL-CCNC: 18 U/L
ANION GAP SERPL CALC-SCNC: 15 MMOL/L
AST SERPL-CCNC: 30 U/L
BASOPHILS # BLD AUTO: 0 K/UL
BASOPHILS # BLD AUTO: 0 K/UL
BASOPHILS NFR BLD AUTO: 0 %
BASOPHILS NFR BLD AUTO: 0 %
BILIRUB SERPL-MCNC: 0.3 MG/DL
BUN SERPL-MCNC: 22 MG/DL
CALCIUM SERPL-MCNC: 9.8 MG/DL
CHLORIDE SERPL-SCNC: 97 MMOL/L
CO2 SERPL-SCNC: 24 MMOL/L
CREAT SERPL-MCNC: 0.67 MG/DL
CRP SERPL-MCNC: <3 MG/L
EGFR: 95 ML/MIN/1.73M2
EOSINOPHIL # BLD AUTO: 0.15 K/UL
EOSINOPHIL # BLD AUTO: 0.15 K/UL
EOSINOPHIL NFR BLD AUTO: 3.5 %
EOSINOPHIL NFR BLD AUTO: 3.5 %
ERYTHROCYTE [SEDIMENTATION RATE] IN BLOOD BY WESTERGREN METHOD: 18 MM/HR
FERRITIN SERPL-MCNC: 95 NG/ML
FOLATE SERPL-MCNC: >20 NG/ML
GIANT PLATELETS BLD QL SMEAR: PRESENT
GLUCOSE SERPL-MCNC: 68 MG/DL
HAPTOGLOB SERPL-MCNC: 74 MG/DL
HCT VFR BLD CALC: 31.8 %
HGB BLD-MCNC: 10.4 G/DL
HYPOCHROMIA BLD QL SMEAR: SLIGHT
IRON SATN MFR SERPL: 22 %
IRON SERPL-MCNC: 73 UG/DL
LDH SERPL-CCNC: 246 U/L
LYMPHOCYTES # BLD AUTO: 1.08 K/UL
LYMPHOCYTES # BLD AUTO: 1.08 K/UL
LYMPHOCYTES NFR BLD AUTO: 25.2 %
LYMPHOCYTES NFR BLD AUTO: 25.2 %
MAN DIFF?: NORMAL
MCHC RBC-ENTMCNC: 32.1 PG
MCHC RBC-ENTMCNC: 32.7 GM/DL
MCV RBC AUTO: 98.1 FL
MONOCYTES # BLD AUTO: 0.15 K/UL
MONOCYTES # BLD AUTO: 0.15 K/UL
MONOCYTES NFR BLD AUTO: 3.5 %
MONOCYTES NFR BLD AUTO: 3.5 %
MSMEAR: NORMAL
NEUTROPHILS # BLD AUTO: 2.91 K/UL
NEUTROPHILS # BLD AUTO: 2.91 K/UL
NEUTROPHILS NFR BLD AUTO: 67.8 %
NEUTROPHILS NFR BLD AUTO: 67.8 %
PLAT MORPH BLD: ABNORMAL
PLATELET # BLD AUTO: 224 K/UL
POLYCHROMASIA BLD QL SMEAR: SLIGHT
POTASSIUM SERPL-SCNC: 3.8 MMOL/L
PROT SERPL-MCNC: 7.3 G/DL
RBC # BLD: 3.24 M/UL
RBC # BLD: 3.24 M/UL
RBC # FLD: 11.8 %
RBC BLD AUTO: NORMAL
RETICS # AUTO: 1 %
RETICS AGGREG/RBC NFR: 32.4 K/UL
SODIUM SERPL-SCNC: 136 MMOL/L
TIBC SERPL-MCNC: 339 UG/DL
UIBC SERPL-MCNC: 266 UG/DL
VIT B12 SERPL-MCNC: 846 PG/ML
WBC # FLD AUTO: 4.29 K/UL

## 2023-11-16 ENCOUNTER — NON-APPOINTMENT (OUTPATIENT)
Age: 68
End: 2023-11-16

## 2024-05-01 ENCOUNTER — NON-APPOINTMENT (OUTPATIENT)
Age: 69
End: 2024-05-01

## 2024-05-01 ENCOUNTER — APPOINTMENT (OUTPATIENT)
Dept: HEMATOLOGY ONCOLOGY | Facility: CLINIC | Age: 69
End: 2024-05-01
Payer: MEDICARE

## 2024-05-01 VITALS
OXYGEN SATURATION: 99 % | HEART RATE: 75 BPM | SYSTOLIC BLOOD PRESSURE: 102 MMHG | HEIGHT: 70 IN | BODY MASS INDEX: 18.61 KG/M2 | TEMPERATURE: 97.3 F | WEIGHT: 130 LBS | DIASTOLIC BLOOD PRESSURE: 62 MMHG

## 2024-05-01 DIAGNOSIS — D72.819 DECREASED WHITE BLOOD CELL COUNT, UNSPECIFIED: ICD-10-CM

## 2024-05-01 DIAGNOSIS — R53.83 OTHER FATIGUE: ICD-10-CM

## 2024-05-01 DIAGNOSIS — D63.8 ANEMIA IN OTHER CHRONIC DISEASES CLASSIFIED ELSEWHERE: ICD-10-CM

## 2024-05-01 DIAGNOSIS — D64.9 ANEMIA, UNSPECIFIED: ICD-10-CM

## 2024-05-01 DIAGNOSIS — D75.89 OTHER SPECIFIED DISEASES OF BLOOD AND BLOOD-FORMING ORGANS: ICD-10-CM

## 2024-05-01 LAB
ERYTHROCYTE [SEDIMENTATION RATE] IN BLOOD BY WESTERGREN METHOD: 18 MM/HR
RBC # BLD: 3.01 M/UL
RETICS # AUTO: 0.8 %
RETICS AGGREG/RBC NFR: 23.8 K/UL

## 2024-05-01 PROCEDURE — 99214 OFFICE O/P EST MOD 30 MIN: CPT

## 2024-05-01 PROCEDURE — G2211 COMPLEX E/M VISIT ADD ON: CPT

## 2024-05-01 PROCEDURE — 36415 COLL VENOUS BLD VENIPUNCTURE: CPT

## 2024-05-01 NOTE — REVIEW OF SYSTEMS
[Fatigue] : fatigue [Negative] : Allergic/Immunologic [Fever] : no fever [Chills] : no chills [Night Sweats] : no night sweats [Recent Change In Weight] : ~T no recent weight change [Vision Problems] : no vision problems [Nosebleeds] : no nosebleeds [Chest Pain] : no chest pain [Lower Ext Edema] : no lower extremity edema [Shortness Of Breath] : no shortness of breath [Abdominal Pain] : no abdominal pain [Joint Pain] : no joint pain [Joint Stiffness] : no joint stiffness [Skin Rash] : no skin rash [Dizziness] : no dizziness [Muscle Weakness] : no muscle weakness [Easy Bleeding] : no tendency for easy bleeding [Easy Bruising] : no tendency for easy bruising [FreeTextEntry9] : Occasional sciatica, arthritic type discomfort

## 2024-05-01 NOTE — PHYSICAL EXAM
[Thin] : thin [Normal] : affect appropriate [de-identified] : RRR, S1,S2, murmur [de-identified] : warm, dry, pale, without ecchymoses or petechiae

## 2024-05-01 NOTE — HISTORY OF PRESENT ILLNESS
[de-identified] : Patient is a 69 year old female with a history of osteoporosis, pericarditis, mitral valve prolapse, heart murmur, Hashimoto's thyroiditis, anemia, and chronic intermittent leukopenia, who now presents hematologic follow-up. At the last visit in November 2023, the CBC was significant for normal white blood count at 4.29 with a normal differential, hemoglobin stable at 10.4, hematocrit 31.8 and a platelet count of 224,000 comprehensive metabolic panel was unremarkable. Sed rate, CRP, B12, folate, iron panel and ferritin were normal. LDH was 246 and haptoglobin was normal at 74. Patient saw her endocrinologist at the end of march and chemistries were unremarkable except for sodium of 134. Her weight is stable. Today, the patient feels fatigued and complains of occasional sciatica and arthritic type discomfort. She is otherwise feeling generally well. Patient is scheduled for follow up with her PCP, Dr. Kidd in June. Denies fever, chills, drenching night sweats, chest pain, palpitation, shortness of breath, skin rashes, vision changes, abdominal pain, bruises or recent infections.

## 2024-05-01 NOTE — REASON FOR VISIT
[Follow-Up Visit] : a follow-up visit for [FreeTextEntry2] : Anemia, anemia of chronic illness, leukopenia, macrocytosis, fatigue

## 2024-05-01 NOTE — ASSESSMENT
[FreeTextEntry1] : Patient is a 69 year old female with a history of osteoporosis, pericarditis, mitral valve prolapse, heart murmur, Hashimoto's thyroiditis, anemia, and chronic intermittent leukopenia, who now presents for hematologic follow-up. Anemia is likely due to her immune disorder/other chronic illness and has remained stable for years. Leukopenia is intermittent and the white blood count has been essentially in the low end of normal for some time. Patient notes that the white count is usually below the normal when the specimen is obtained fasting. However, myelodysplasia remains a possibility. Patient feels generally well and wishes to continue with blood count monitoring for the time being. Have ordered FERNANDA, B12 and folate, CBC with auto differential, CMP, CRP, ferritin, Haptoglobin, iron panel, LDH, manual differential, reticulocyte count and sed rate. Venipuncture was performed today at the office. Patient was advised to call office to discuss results. Pending results, patient will return in 4 months.

## 2024-05-02 LAB
ALBUMIN SERPL ELPH-MCNC: 4.7 G/DL
ALP BLD-CCNC: 63 U/L
ALT SERPL-CCNC: 20 U/L
ANION GAP SERPL CALC-SCNC: 12 MMOL/L
AST SERPL-CCNC: 28 U/L
BASOPHILS # BLD AUTO: 0 K/UL
BASOPHILS # BLD AUTO: 0 K/UL
BASOPHILS NFR BLD AUTO: 0 %
BASOPHILS NFR BLD AUTO: 0 %
BILIRUB SERPL-MCNC: 0.2 MG/DL
BUN SERPL-MCNC: 20 MG/DL
CALCIUM SERPL-MCNC: 10 MG/DL
CHLORIDE SERPL-SCNC: 98 MMOL/L
CO2 SERPL-SCNC: 25 MMOL/L
CREAT SERPL-MCNC: 0.72 MG/DL
CRP SERPL-MCNC: <3 MG/L
EGFR: 90 ML/MIN/1.73M2
EOSINOPHIL # BLD AUTO: 0.15 K/UL
EOSINOPHIL # BLD AUTO: 0.15 K/UL
EOSINOPHIL NFR BLD AUTO: 3.5 %
EOSINOPHIL NFR BLD AUTO: 3.5 %
FERRITIN SERPL-MCNC: 87 NG/ML
FOLATE SERPL-MCNC: >20 NG/ML
GLUCOSE SERPL-MCNC: 93 MG/DL
HAPTOGLOB SERPL-MCNC: 70 MG/DL
HCT VFR BLD CALC: 29.2 %
HGB BLD-MCNC: 9.8 G/DL
HYPOCHROMIA BLD QL SMEAR: SLIGHT
IRON SATN MFR SERPL: 18 %
IRON SERPL-MCNC: 56 UG/DL
LDH SERPL-CCNC: 239 U/L
LYMPHOCYTES # BLD AUTO: 1.26 K/UL
LYMPHOCYTES # BLD AUTO: 1.26 K/UL
LYMPHOCYTES NFR BLD AUTO: 29.6 %
LYMPHOCYTES NFR BLD AUTO: 29.6 %
MAN DIFF?: NORMAL
MCHC RBC-ENTMCNC: 32.6 PG
MCHC RBC-ENTMCNC: 33.6 GM/DL
MCV RBC AUTO: 97 FL
MONOCYTES # BLD AUTO: 0.22 K/UL
MONOCYTES # BLD AUTO: 0.22 K/UL
MONOCYTES NFR BLD AUTO: 5.2 %
MONOCYTES NFR BLD AUTO: 5.2 %
MSMEAR: NORMAL
NEUTROPHILS # BLD AUTO: 2.62 K/UL
NEUTROPHILS # BLD AUTO: 2.62 K/UL
NEUTROPHILS NFR BLD AUTO: 61.7 %
NEUTROPHILS NFR BLD AUTO: 61.7 %
PLAT MORPH BLD: NORMAL
PLATELET # BLD AUTO: 196 K/UL
POTASSIUM SERPL-SCNC: 4.5 MMOL/L
PROT SERPL-MCNC: 7.1 G/DL
RBC # BLD: 3.01 M/UL
RBC # FLD: 11.7 %
RBC BLD AUTO: NORMAL
SODIUM SERPL-SCNC: 134 MMOL/L
TIBC SERPL-MCNC: 314 UG/DL
UIBC SERPL-MCNC: 258 UG/DL
VIT B12 SERPL-MCNC: 1007 PG/ML
WBC # FLD AUTO: 4.24 K/UL

## 2024-05-03 LAB — ANA SER IF-ACNC: NEGATIVE

## 2024-06-14 ENCOUNTER — NON-APPOINTMENT (OUTPATIENT)
Age: 69
End: 2024-06-14

## 2024-06-17 ENCOUNTER — NON-APPOINTMENT (OUTPATIENT)
Age: 69
End: 2024-06-17

## 2024-08-20 ENCOUNTER — APPOINTMENT (OUTPATIENT)
Dept: HEMATOLOGY ONCOLOGY | Facility: CLINIC | Age: 69
End: 2024-08-20
Payer: MEDICARE

## 2024-08-20 VITALS
BODY MASS INDEX: 18.47 KG/M2 | OXYGEN SATURATION: 95 % | DIASTOLIC BLOOD PRESSURE: 64 MMHG | HEIGHT: 70 IN | HEART RATE: 75 BPM | SYSTOLIC BLOOD PRESSURE: 120 MMHG | WEIGHT: 129 LBS | TEMPERATURE: 98 F

## 2024-08-20 DIAGNOSIS — D72.819 DECREASED WHITE BLOOD CELL COUNT, UNSPECIFIED: ICD-10-CM

## 2024-08-20 DIAGNOSIS — D64.9 ANEMIA, UNSPECIFIED: ICD-10-CM

## 2024-08-20 DIAGNOSIS — R53.83 OTHER FATIGUE: ICD-10-CM

## 2024-08-20 DIAGNOSIS — D75.89 OTHER SPECIFIED DISEASES OF BLOOD AND BLOOD-FORMING ORGANS: ICD-10-CM

## 2024-08-20 PROCEDURE — G2211 COMPLEX E/M VISIT ADD ON: CPT

## 2024-08-20 PROCEDURE — 99214 OFFICE O/P EST MOD 30 MIN: CPT

## 2024-08-20 RX ORDER — ATORVASTATIN CALCIUM 10 MG/1
10 TABLET, FILM COATED ORAL
Refills: 0 | Status: ACTIVE | COMMUNITY

## 2024-08-20 NOTE — REASON FOR VISIT
[Follow-Up Visit] : a follow-up visit for [FreeTextEntry2] : Leukopenia, anemia, macrocytosis, fatigue

## 2024-08-20 NOTE — REVIEW OF SYSTEMS
[Fatigue] : fatigue [Negative] : Allergic/Immunologic [Fever] : no fever [Chills] : no chills [Night Sweats] : no night sweats [Recent Change In Weight] : ~T no recent weight change [Eye Pain] : no eye pain [Vision Problems] : no vision problems [Nosebleeds] : no nosebleeds [Chest Pain] : no chest pain [Lower Ext Edema] : no lower extremity edema [Shortness Of Breath] : no shortness of breath [Cough] : no cough [Abdominal Pain] : no abdominal pain [Joint Pain] : no joint pain [Joint Stiffness] : no joint stiffness [Skin Rash] : no skin rash [Dizziness] : no dizziness [Muscle Weakness] : no muscle weakness [Easy Bleeding] : no tendency for easy bleeding [Easy Bruising] : no tendency for easy bruising [FreeTextEntry5] : Recent tachycardia durig cardiac CT scan [FreeTextEntry9] : Has sciatica

## 2024-08-20 NOTE — ASSESSMENT
[FreeTextEntry1] : Patient is a 69 year old female with a history of osteoporosis, pericarditis, mitral valve prolapse, heart murmur, Hashimoto's thyroiditis, anemia, and chronic intermittent leukopenia, who now presents for hematologic. Anemia is likely due to her immune disorder/other chronic illness and has remained relatively stable for years. Leukopenia is intermittent and the white blood count has been essentially in the low end of normal for some time. Patient notes that the white count is usually below the normal when the specimen is obtained fasting. However, myelodysplasia remains a possibility. Patient feels generally well and wishes to continue with blood count monitoring for the time being. Patient is unable to go to the lab today. Will defer lab draw until next week, at which time we will order a CBC, manual differential, B12 and folate, CMP, CRP, iron panel, ferritin, LDH and haptoglobin, reticulocyte count and sed rate. Patient was advised to call the office to discuss results and further management. Pending results, patient will return in 3-4 months.

## 2024-08-20 NOTE — PHYSICAL EXAM
[Thin] : thin [Normal] : affect appropriate [de-identified] : RRR, S1,S2, murmur [de-identified] : warm, dry, pale, without ecchymoses or petechiae

## 2024-08-20 NOTE — HISTORY OF PRESENT ILLNESS
[de-identified] : Patient is a 69 year old female with a history of osteoporosis, pericarditis, mitral valve prolapse, heart murmur, Hashimoto's thyroiditis, anemia, and chronic intermittent leukopenia, who now presents hematologic follow-up. At the last visit in May, the CBC was significant for a slightly lower hemoglobin at 9.8 and a hematocrit of 29.2. The white count was very normal at 4.24 and the platelet count 169,000. Sed rate, haptoglobin, LDH were normal. C-reactive protein and FERNANDA were normal. Iron panel was significant for a saturation of 18% and the ferritin was 87. Comprehensive metabolic panel was significant for sodium of 134. In June, patient saw Dr. Kidd at which time the white blood count was 4.0, hemoglobin was 10.0, hematocrit was 31.2 and the platelet count was 211,000 with a normal differential. Patient recently had tachycardia during a cardiac CT scan  which was only partially completed. For an elevated calcium score the patient was placed on Lipitor 10 mg which she tolerates well.  She will follow up with Dr. Kidd in October of 2024. Patient did increase her quick dissolve B12 from 4 days a week to 5 days a week. Her weight is stable. Today, the patient feels fatigued and complains of sciatica but is otherwise feeling generally well. Denies fever, chills, drenching night sweats, shortness of breath, skin rashes, vision changes, abdominal pain, bruises or recent infections.

## 2024-09-04 ENCOUNTER — APPOINTMENT (OUTPATIENT)
Dept: HEMATOLOGY ONCOLOGY | Facility: CLINIC | Age: 69
End: 2024-09-04
Payer: MEDICARE

## 2024-09-04 DIAGNOSIS — D72.819 DECREASED WHITE BLOOD CELL COUNT, UNSPECIFIED: ICD-10-CM

## 2024-09-04 DIAGNOSIS — D63.8 ANEMIA IN OTHER CHRONIC DISEASES CLASSIFIED ELSEWHERE: ICD-10-CM

## 2024-09-04 DIAGNOSIS — D64.9 ANEMIA, UNSPECIFIED: ICD-10-CM

## 2024-09-04 DIAGNOSIS — R53.83 OTHER FATIGUE: ICD-10-CM

## 2024-09-04 DIAGNOSIS — D75.89 OTHER SPECIFIED DISEASES OF BLOOD AND BLOOD-FORMING ORGANS: ICD-10-CM

## 2024-09-04 PROCEDURE — 36415 COLL VENOUS BLD VENIPUNCTURE: CPT

## 2024-09-09 ENCOUNTER — NON-APPOINTMENT (OUTPATIENT)
Age: 69
End: 2024-09-09

## 2024-09-09 LAB
ALBUMIN SERPL ELPH-MCNC: 4.4 G/DL
ALP BLD-CCNC: 59 U/L
ALT SERPL-CCNC: 16 U/L
ANION GAP SERPL CALC-SCNC: 12 MMOL/L
AST SERPL-CCNC: 30 U/L
BASOPHILS # BLD AUTO: 0.03 K/UL
BASOPHILS # BLD AUTO: 0.03 K/UL
BASOPHILS NFR BLD AUTO: 0.9 %
BASOPHILS NFR BLD AUTO: 0.9 %
BILIRUB SERPL-MCNC: 0.2 MG/DL
BUN SERPL-MCNC: 22 MG/DL
CALCIUM SERPL-MCNC: 9.6 MG/DL
CHLORIDE SERPL-SCNC: 99 MMOL/L
CO2 SERPL-SCNC: 26 MMOL/L
CREAT SERPL-MCNC: 0.67 MG/DL
EGFR: 94 ML/MIN/1.73M2
EOSINOPHIL # BLD AUTO: 0.2 K/UL
EOSINOPHIL # BLD AUTO: 0.2 K/UL
EOSINOPHIL NFR BLD AUTO: 5.3 %
EOSINOPHIL NFR BLD AUTO: 5.3 %
ERYTHROCYTE [SEDIMENTATION RATE] IN BLOOD BY WESTERGREN METHOD: 13 MM/HR
FERRITIN SERPL-MCNC: 88 NG/ML
FOLATE SERPL-MCNC: >20 NG/ML
GIANT PLATELETS BLD QL SMEAR: PRESENT
GLUCOSE SERPL-MCNC: 97 MG/DL
HAPTOGLOB SERPL-MCNC: 69 MG/DL
HCT VFR BLD CALC: 31 %
HGB BLD-MCNC: 9.8 G/DL
HYPOCHROMIA BLD QL SMEAR: SLIGHT
IRON SATN MFR SERPL: 23 %
IRON SERPL-MCNC: 68 UG/DL
LDH SERPL-CCNC: 251 U/L
LYMPHOCYTES # BLD AUTO: 1.13 K/UL
LYMPHOCYTES # BLD AUTO: 1.13 K/UL
LYMPHOCYTES NFR BLD AUTO: 30.7 %
LYMPHOCYTES NFR BLD AUTO: 30.7 %
MAN DIFF?: NORMAL
MCHC RBC-ENTMCNC: 31.4 PG
MCHC RBC-ENTMCNC: 31.6 GM/DL
MCV RBC AUTO: 99.4 FL
MONOCYTES # BLD AUTO: 0.13 K/UL
MONOCYTES # BLD AUTO: 0.13 K/UL
MONOCYTES NFR BLD AUTO: 3.5 %
MONOCYTES NFR BLD AUTO: 3.5 %
MSMEAR: NORMAL
NEUTROPHILS # BLD AUTO: 2.19 K/UL
NEUTROPHILS # BLD AUTO: 2.19 K/UL
NEUTROPHILS NFR BLD AUTO: 59.6 %
NEUTROPHILS NFR BLD AUTO: 59.6 %
PLAT MORPH BLD: ABNORMAL
PLATELET # BLD AUTO: 197 K/UL
POIKILOCYTOSIS BLD QL SMEAR: SLIGHT
POTASSIUM SERPL-SCNC: 4.7 MMOL/L
PROT SERPL-MCNC: 6.6 G/DL
RBC # BLD: 3.12 M/UL
RBC # BLD: 3.12 M/UL
RBC # FLD: 11.8 %
RBC BLD AUTO: ABNORMAL
RETICS # AUTO: 1 %
RETICS AGGREG/RBC NFR: 31.5 K/UL
SODIUM SERPL-SCNC: 137 MMOL/L
SPHEROCYTES BLD QL SMEAR: SLIGHT
TIBC SERPL-MCNC: 294 UG/DL
UIBC SERPL-MCNC: 226 UG/DL
VIT B12 SERPL-MCNC: 1191 PG/ML
WBC # FLD AUTO: 3.68 K/UL

## 2024-11-19 ENCOUNTER — APPOINTMENT (OUTPATIENT)
Dept: HEMATOLOGY ONCOLOGY | Facility: CLINIC | Age: 69
End: 2024-11-19
Payer: MEDICARE

## 2024-11-19 VITALS
HEART RATE: 71 BPM | OXYGEN SATURATION: 99 % | TEMPERATURE: 97.3 F | HEIGHT: 70 IN | SYSTOLIC BLOOD PRESSURE: 112 MMHG | DIASTOLIC BLOOD PRESSURE: 68 MMHG | BODY MASS INDEX: 17.9 KG/M2 | WEIGHT: 125 LBS

## 2024-11-19 DIAGNOSIS — Z23 ENCOUNTER FOR IMMUNIZATION: ICD-10-CM

## 2024-11-19 DIAGNOSIS — D75.89 OTHER SPECIFIED DISEASES OF BLOOD AND BLOOD-FORMING ORGANS: ICD-10-CM

## 2024-11-19 DIAGNOSIS — D63.8 ANEMIA IN OTHER CHRONIC DISEASES CLASSIFIED ELSEWHERE: ICD-10-CM

## 2024-11-19 DIAGNOSIS — R53.83 OTHER FATIGUE: ICD-10-CM

## 2024-11-19 DIAGNOSIS — D64.9 ANEMIA, UNSPECIFIED: ICD-10-CM

## 2024-11-19 LAB
ERYTHROCYTE [SEDIMENTATION RATE] IN BLOOD BY WESTERGREN METHOD: 16 MM/HR
RBC # BLD: 3.2 M/UL
RETICS # AUTO: 0.9 %
RETICS AGGREG/RBC NFR: 29.1 K/UL

## 2024-11-19 PROCEDURE — 99214 OFFICE O/P EST MOD 30 MIN: CPT

## 2024-11-19 PROCEDURE — G0008: CPT

## 2024-11-19 PROCEDURE — 90662 IIV NO PRSV INCREASED AG IM: CPT

## 2024-11-19 PROCEDURE — G2211 COMPLEX E/M VISIT ADD ON: CPT

## 2024-11-19 PROCEDURE — 36415 COLL VENOUS BLD VENIPUNCTURE: CPT

## 2024-11-20 LAB
ALBUMIN SERPL ELPH-MCNC: 4.9 G/DL
ALP BLD-CCNC: 70 U/L
ALT SERPL-CCNC: 22 U/L
ANION GAP SERPL CALC-SCNC: 13 MMOL/L
ANISOCYTOSIS BLD QL: SLIGHT
AST SERPL-CCNC: 31 U/L
B2 MICROGLOB SERPL-MCNC: 1.5 MG/L
BASOPHILS # BLD AUTO: 0.07 K/UL
BASOPHILS # BLD AUTO: 0.07 K/UL
BASOPHILS NFR BLD AUTO: 1.8 %
BASOPHILS NFR BLD AUTO: 1.8 %
BILIRUB SERPL-MCNC: 0.2 MG/DL
BUN SERPL-MCNC: 25 MG/DL
CALCIUM SERPL-MCNC: 9.8 MG/DL
CHLORIDE SERPL-SCNC: 97 MMOL/L
CO2 SERPL-SCNC: 25 MMOL/L
CREAT SERPL-MCNC: 0.71 MG/DL
CRP SERPL-MCNC: <3 MG/L
EGFR: 91 ML/MIN/1.73M2
EOSINOPHIL # BLD AUTO: 0.17 K/UL
EOSINOPHIL # BLD AUTO: 0.17 K/UL
EOSINOPHIL NFR BLD AUTO: 4.5 %
EOSINOPHIL NFR BLD AUTO: 4.5 %
FERRITIN SERPL-MCNC: 93 NG/ML
FOLATE SERPL-MCNC: >20 NG/ML
GIANT PLATELETS BLD QL SMEAR: PRESENT
GLUCOSE SERPL-MCNC: 67 MG/DL
HAPTOGLOB SERPL-MCNC: 70 MG/DL
HCT VFR BLD CALC: 32.5 %
HGB BLD-MCNC: 10.3 G/DL
IRON SATN MFR SERPL: 20 %
IRON SERPL-MCNC: 70 UG/DL
LDH SERPL-CCNC: 269 U/L
LYMPHOCYTES # BLD AUTO: 1.31 K/UL
LYMPHOCYTES # BLD AUTO: 1.31 K/UL
LYMPHOCYTES NFR BLD AUTO: 33.9 %
LYMPHOCYTES NFR BLD AUTO: 33.9 %
MACROCYTES BLD QL SMEAR: SLIGHT
MAN DIFF?: NORMAL
MCHC RBC-ENTMCNC: 31.7 G/DL
MCHC RBC-ENTMCNC: 32.2 PG
MCV RBC AUTO: 101.6 FL
MONOCYTES # BLD AUTO: 0.03 K/UL
MONOCYTES # BLD AUTO: 0.03 K/UL
MONOCYTES NFR BLD AUTO: 0.9 %
MONOCYTES NFR BLD AUTO: 0.9 %
MSMEAR: NORMAL
NEUTROPHILS # BLD AUTO: 2.27 K/UL
NEUTROPHILS # BLD AUTO: 2.27 K/UL
NEUTROPHILS NFR BLD AUTO: 58.9 %
NEUTROPHILS NFR BLD AUTO: 58.9 %
PLAT MORPH BLD: ABNORMAL
PLATELET # BLD AUTO: 209 K/UL
POLYCHROMASIA BLD QL SMEAR: SLIGHT
POTASSIUM SERPL-SCNC: 4.1 MMOL/L
PROT SERPL-MCNC: 7.4 G/DL
RBC # BLD: 3.2 M/UL
RBC # FLD: 11.9 %
RBC BLD AUTO: NORMAL
SODIUM SERPL-SCNC: 135 MMOL/L
TIBC SERPL-MCNC: 353 UG/DL
UIBC SERPL-MCNC: 284 UG/DL
URATE SERPL-MCNC: 4 MG/DL
VIT B12 SERPL-MCNC: 1266 PG/ML
WBC # FLD AUTO: 3.85 K/UL

## 2024-11-21 ENCOUNTER — NON-APPOINTMENT (OUTPATIENT)
Age: 69
End: 2024-11-21

## 2025-02-12 ENCOUNTER — APPOINTMENT (OUTPATIENT)
Dept: OPHTHALMOLOGY | Facility: CLINIC | Age: 70
End: 2025-02-12

## 2025-02-25 ENCOUNTER — APPOINTMENT (OUTPATIENT)
Dept: HEMATOLOGY ONCOLOGY | Facility: CLINIC | Age: 70
End: 2025-02-25

## 2025-03-11 ENCOUNTER — APPOINTMENT (OUTPATIENT)
Dept: HEMATOLOGY ONCOLOGY | Facility: CLINIC | Age: 70
End: 2025-03-11
Payer: MEDICARE

## 2025-03-11 VITALS
OXYGEN SATURATION: 98 % | TEMPERATURE: 97.8 F | HEIGHT: 70 IN | HEART RATE: 76 BPM | BODY MASS INDEX: 17.9 KG/M2 | DIASTOLIC BLOOD PRESSURE: 78 MMHG | SYSTOLIC BLOOD PRESSURE: 122 MMHG | WEIGHT: 125 LBS

## 2025-03-11 DIAGNOSIS — D75.89 OTHER SPECIFIED DISEASES OF BLOOD AND BLOOD-FORMING ORGANS: ICD-10-CM

## 2025-03-11 DIAGNOSIS — D64.9 ANEMIA, UNSPECIFIED: ICD-10-CM

## 2025-03-11 DIAGNOSIS — D72.819 DECREASED WHITE BLOOD CELL COUNT, UNSPECIFIED: ICD-10-CM

## 2025-03-11 DIAGNOSIS — R53.83 OTHER FATIGUE: ICD-10-CM

## 2025-03-11 DIAGNOSIS — D63.8 ANEMIA IN OTHER CHRONIC DISEASES CLASSIFIED ELSEWHERE: ICD-10-CM

## 2025-03-11 LAB
ERYTHROCYTE [SEDIMENTATION RATE] IN BLOOD BY WESTERGREN METHOD: 13 MM/HR
RBC # BLD: 3.16 M/UL
RETICS # AUTO: 1 %
RETICS AGGREG/RBC NFR: 31.6 K/UL

## 2025-03-11 PROCEDURE — 99214 OFFICE O/P EST MOD 30 MIN: CPT

## 2025-03-11 PROCEDURE — 36415 COLL VENOUS BLD VENIPUNCTURE: CPT

## 2025-03-11 PROCEDURE — G2211 COMPLEX E/M VISIT ADD ON: CPT

## 2025-03-11 RX ORDER — IRON BIS-GLYCINAT/VIT C/FA/B12 28-60-0.4
CAPSULE ORAL
Refills: 0 | Status: ACTIVE | COMMUNITY

## 2025-03-12 LAB
ALBUMIN SERPL ELPH-MCNC: 4.7 G/DL
ALP BLD-CCNC: 69 U/L
ALT SERPL-CCNC: 20 U/L
ANION GAP SERPL CALC-SCNC: 11 MMOL/L
AST SERPL-CCNC: 28 U/L
B2 MICROGLOB SERPL-MCNC: 1.6 MG/L
BASOPHILS # BLD AUTO: 0 K/UL
BASOPHILS # BLD AUTO: 0 K/UL
BASOPHILS NFR BLD AUTO: 0 %
BASOPHILS NFR BLD AUTO: 0 %
BILIRUB SERPL-MCNC: 0.2 MG/DL
BUN SERPL-MCNC: 26 MG/DL
CALCIUM SERPL-MCNC: 9.8 MG/DL
CHLORIDE SERPL-SCNC: 99 MMOL/L
CO2 SERPL-SCNC: 25 MMOL/L
CREAT SERPL-MCNC: 0.72 MG/DL
CRP SERPL-MCNC: <3 MG/L
EGFRCR SERPLBLD CKD-EPI 2021: 89 ML/MIN/1.73M2
EOSINOPHIL # BLD AUTO: 0.04 K/UL
EOSINOPHIL # BLD AUTO: 0.04 K/UL
EOSINOPHIL NFR BLD AUTO: 0.9 %
EOSINOPHIL NFR BLD AUTO: 0.9 %
FERRITIN SERPL-MCNC: 84 NG/ML
FOLATE SERPL-MCNC: >20 NG/ML
GIANT PLATELETS BLD QL SMEAR: PRESENT
GLUCOSE SERPL-MCNC: 80 MG/DL
HAPTOGLOB SERPL-MCNC: 66 MG/DL
HCT VFR BLD CALC: 31.3 %
HGB BLD-MCNC: 10.1 G/DL
HYPOCHROMIA BLD QL SMEAR: SLIGHT
IRON SATN MFR SERPL: 21 %
IRON SERPL-MCNC: 65 UG/DL
LDH SERPL-CCNC: 251 U/L
LYMPHOCYTES # BLD AUTO: 1.5 K/UL
LYMPHOCYTES # BLD AUTO: 1.5 K/UL
LYMPHOCYTES NFR BLD AUTO: 33.9 %
LYMPHOCYTES NFR BLD AUTO: 33.9 %
MAN DIFF?: NORMAL
MANUAL SMEAR: NORMAL
MCHC RBC-ENTMCNC: 32 PG
MCHC RBC-ENTMCNC: 32.3 G/DL
MCV RBC AUTO: 99.1 FL
MONOCYTES # BLD AUTO: 0.08 K/UL
MONOCYTES # BLD AUTO: 0.08 K/UL
MONOCYTES NFR BLD AUTO: 1.7 %
MONOCYTES NFR BLD AUTO: 1.7 %
NEUTROPHILS # BLD AUTO: 2.81 K/UL
NEUTROPHILS # BLD AUTO: 2.81 K/UL
NEUTROPHILS NFR BLD AUTO: 63.5 %
NEUTROPHILS NFR BLD AUTO: 63.5 %
PLAT MORPH BLD: ABNORMAL
PLATELET # BLD AUTO: 197 K/UL
POTASSIUM SERPL-SCNC: 4.1 MMOL/L
PROT SERPL-MCNC: 6.9 G/DL
RBC # BLD: 3.16 M/UL
RBC # FLD: 11.8 %
RBC BLD AUTO: NORMAL
SODIUM SERPL-SCNC: 135 MMOL/L
TIBC SERPL-MCNC: 311 UG/DL
UIBC SERPL-MCNC: 247 UG/DL
URATE SERPL-MCNC: 4.1 MG/DL
VIT B12 SERPL-MCNC: 967 PG/ML
WBC # FLD AUTO: 4.43 K/UL

## 2025-03-13 LAB — ANA SER IF-ACNC: NEGATIVE

## 2025-03-17 ENCOUNTER — NON-APPOINTMENT (OUTPATIENT)
Age: 70
End: 2025-03-17

## 2025-07-08 ENCOUNTER — APPOINTMENT (OUTPATIENT)
Dept: HEMATOLOGY ONCOLOGY | Facility: CLINIC | Age: 70
End: 2025-07-08

## 2025-07-16 ENCOUNTER — APPOINTMENT (OUTPATIENT)
Dept: OPHTHALMOLOGY | Facility: CLINIC | Age: 70
End: 2025-07-16
Payer: MEDICARE

## 2025-07-16 ENCOUNTER — NON-APPOINTMENT (OUTPATIENT)
Age: 70
End: 2025-07-16

## 2025-07-16 PROCEDURE — 92014 COMPRE OPH EXAM EST PT 1/>: CPT

## 2025-07-16 PROCEDURE — 92250 FUNDUS PHOTOGRAPHY W/I&R: CPT
